# Patient Record
Sex: FEMALE | Race: BLACK OR AFRICAN AMERICAN | NOT HISPANIC OR LATINO | Employment: UNEMPLOYED | URBAN - METROPOLITAN AREA
[De-identification: names, ages, dates, MRNs, and addresses within clinical notes are randomized per-mention and may not be internally consistent; named-entity substitution may affect disease eponyms.]

---

## 2021-07-10 ENCOUNTER — HOSPITAL ENCOUNTER (INPATIENT)
Facility: HOSPITAL | Age: 41
LOS: 8 days | Discharge: NURSING, REHAB OR LONG-TERM CARE | DRG: 812 | End: 2021-07-20
Attending: EMERGENCY MEDICINE | Admitting: STUDENT IN AN ORGANIZED HEALTH CARE EDUCATION/TRAINING PROGRAM
Payer: COMMERCIAL

## 2021-07-10 ENCOUNTER — APPOINTMENT (EMERGENCY)
Dept: RADIOLOGY | Facility: HOSPITAL | Age: 41
DRG: 812 | End: 2021-07-10
Payer: COMMERCIAL

## 2021-07-10 DIAGNOSIS — D57.00 SICKLE CELL PAIN CRISIS (HCC): Primary | ICD-10-CM

## 2021-07-10 DIAGNOSIS — R07.9 CHEST PAIN: ICD-10-CM

## 2021-07-10 DIAGNOSIS — D57.00 SICKLE CELL DISEASE WITH CRISIS (HCC): ICD-10-CM

## 2021-07-10 DIAGNOSIS — D57.00 SICKLE CELL ANEMIA WITH PAIN (HCC): ICD-10-CM

## 2021-07-10 DIAGNOSIS — K59.00 CONSTIPATION: ICD-10-CM

## 2021-07-10 DIAGNOSIS — M54.9 ACUTE BACK PAIN: ICD-10-CM

## 2021-07-10 PROBLEM — D72.829 ELEVATED WBC COUNT: Status: ACTIVE | Noted: 2021-07-10

## 2021-07-10 PROBLEM — D57.1 SICKLE CELL ANEMIA (HCC): Status: ACTIVE | Noted: 2021-07-10

## 2021-07-10 PROBLEM — E11.9 DIET-CONTROLLED DIABETES MELLITUS (HCC): Status: ACTIVE | Noted: 2021-07-10

## 2021-07-10 LAB
ALBUMIN SERPL BCP-MCNC: 4 G/DL (ref 3.5–5)
ALP SERPL-CCNC: 48 U/L (ref 46–116)
ALT SERPL W P-5'-P-CCNC: 32 U/L (ref 12–78)
ANION GAP SERPL CALCULATED.3IONS-SCNC: 10 MMOL/L (ref 4–13)
ANISOCYTOSIS BLD QL SMEAR: PRESENT
AST SERPL W P-5'-P-CCNC: 30 U/L (ref 5–45)
BACTERIA UR QL AUTO: ABNORMAL /HPF
BASOPHILS # BLD MANUAL: 0 THOUSAND/UL (ref 0–0.1)
BASOPHILS NFR MAR MANUAL: 0 % (ref 0–1)
BILIRUB SERPL-MCNC: 0.76 MG/DL (ref 0.2–1)
BILIRUB UR QL STRIP: NEGATIVE
BUN SERPL-MCNC: 8 MG/DL (ref 5–25)
CALCIUM SERPL-MCNC: 8.6 MG/DL (ref 8.3–10.1)
CHLORIDE SERPL-SCNC: 103 MMOL/L (ref 100–108)
CLARITY UR: ABNORMAL
CO2 SERPL-SCNC: 28 MMOL/L (ref 21–32)
COLOR UR: YELLOW
CREAT SERPL-MCNC: 0.76 MG/DL (ref 0.6–1.3)
EOSINOPHIL # BLD MANUAL: 0 THOUSAND/UL (ref 0–0.4)
EOSINOPHIL NFR BLD MANUAL: 0 % (ref 0–6)
ERYTHROCYTE [DISTWIDTH] IN BLOOD BY AUTOMATED COUNT: 16.6 % (ref 11.6–15.1)
GFR SERPL CREATININE-BSD FRML MDRD: 111 ML/MIN/1.73SQ M
GLUCOSE SERPL-MCNC: 109 MG/DL (ref 65–140)
GLUCOSE SERPL-MCNC: 120 MG/DL (ref 65–140)
GLUCOSE UR STRIP-MCNC: NEGATIVE MG/DL
HCT VFR BLD AUTO: 33.7 % (ref 34.8–46.1)
HGB BLD-MCNC: 11.1 G/DL (ref 11.5–15.4)
HGB UR QL STRIP.AUTO: ABNORMAL
KETONES UR STRIP-MCNC: ABNORMAL MG/DL
LEUKOCYTE ESTERASE UR QL STRIP: NEGATIVE
LYMPHOCYTES # BLD AUTO: 1.21 THOUSAND/UL (ref 0.6–4.47)
LYMPHOCYTES # BLD AUTO: 9 % (ref 14–44)
MCH RBC QN AUTO: 22.2 PG (ref 26.8–34.3)
MCHC RBC AUTO-ENTMCNC: 32.9 G/DL (ref 31.4–37.4)
MCV RBC AUTO: 67 FL (ref 82–98)
MICROCYTES BLD QL AUTO: PRESENT
MONOCYTES # BLD AUTO: 1.08 THOUSAND/UL (ref 0–1.22)
MONOCYTES NFR BLD: 8 % (ref 4–12)
MYELOCYTES NFR BLD MANUAL: 3 % (ref 0–1)
NEUTROPHILS # BLD MANUAL: 10.78 THOUSAND/UL (ref 1.85–7.62)
NEUTS BAND NFR BLD MANUAL: 4 % (ref 0–8)
NEUTS SEG NFR BLD AUTO: 76 % (ref 43–75)
NITRITE UR QL STRIP: NEGATIVE
NON-SQ EPI CELLS URNS QL MICRO: ABNORMAL /HPF
NRBC BLD AUTO-RTO: 2 /100 WBC (ref 0–2)
PH UR STRIP.AUTO: 5.5 [PH] (ref 4.5–8)
PLATELET # BLD AUTO: 251 THOUSANDS/UL (ref 149–390)
PLATELET BLD QL SMEAR: ADEQUATE
PMV BLD AUTO: 9.8 FL (ref 8.9–12.7)
POTASSIUM SERPL-SCNC: 3.5 MMOL/L (ref 3.5–5.3)
PROT SERPL-MCNC: 8.1 G/DL (ref 6.4–8.2)
PROT UR STRIP-MCNC: NEGATIVE MG/DL
RBC # BLD AUTO: 5 MILLION/UL (ref 3.81–5.12)
RBC #/AREA URNS AUTO: ABNORMAL /HPF
RETICS # AUTO: ABNORMAL 10*3/UL (ref 14097–95744)
RETICS # CALC: 2.74 % (ref 0.37–1.87)
SODIUM SERPL-SCNC: 141 MMOL/L (ref 136–145)
SP GR UR STRIP.AUTO: 1.02 (ref 1–1.03)
TOTAL CELLS COUNTED SPEC: 100
TROPONIN I SERPL-MCNC: <0.02 NG/ML
UROBILINOGEN UR QL STRIP.AUTO: 1 E.U./DL
WBC # BLD AUTO: 13.48 THOUSAND/UL (ref 4.31–10.16)
WBC #/AREA URNS AUTO: ABNORMAL /HPF

## 2021-07-10 PROCEDURE — 71046 X-RAY EXAM CHEST 2 VIEWS: CPT

## 2021-07-10 PROCEDURE — 99285 EMERGENCY DEPT VISIT HI MDM: CPT | Performed by: EMERGENCY MEDICINE

## 2021-07-10 PROCEDURE — 84484 ASSAY OF TROPONIN QUANT: CPT | Performed by: EMERGENCY MEDICINE

## 2021-07-10 PROCEDURE — 85007 BL SMEAR W/DIFF WBC COUNT: CPT | Performed by: EMERGENCY MEDICINE

## 2021-07-10 PROCEDURE — 96374 THER/PROPH/DIAG INJ IV PUSH: CPT

## 2021-07-10 PROCEDURE — 99220 PR INITIAL OBSERVATION CARE/DAY 70 MINUTES: CPT | Performed by: FAMILY MEDICINE

## 2021-07-10 PROCEDURE — 82948 REAGENT STRIP/BLOOD GLUCOSE: CPT

## 2021-07-10 PROCEDURE — 99285 EMERGENCY DEPT VISIT HI MDM: CPT

## 2021-07-10 PROCEDURE — 96376 TX/PRO/DX INJ SAME DRUG ADON: CPT

## 2021-07-10 PROCEDURE — 96361 HYDRATE IV INFUSION ADD-ON: CPT

## 2021-07-10 PROCEDURE — 81001 URINALYSIS AUTO W/SCOPE: CPT

## 2021-07-10 PROCEDURE — 85027 COMPLETE CBC AUTOMATED: CPT | Performed by: EMERGENCY MEDICINE

## 2021-07-10 PROCEDURE — 93005 ELECTROCARDIOGRAM TRACING: CPT

## 2021-07-10 PROCEDURE — 85045 AUTOMATED RETICULOCYTE COUNT: CPT | Performed by: EMERGENCY MEDICINE

## 2021-07-10 PROCEDURE — 80053 COMPREHEN METABOLIC PANEL: CPT | Performed by: EMERGENCY MEDICINE

## 2021-07-10 PROCEDURE — 96375 TX/PRO/DX INJ NEW DRUG ADDON: CPT

## 2021-07-10 PROCEDURE — 85025 COMPLETE CBC W/AUTO DIFF WBC: CPT | Performed by: EMERGENCY MEDICINE

## 2021-07-10 PROCEDURE — 36415 COLL VENOUS BLD VENIPUNCTURE: CPT | Performed by: EMERGENCY MEDICINE

## 2021-07-10 RX ORDER — SODIUM CHLORIDE 9 MG/ML
75 INJECTION, SOLUTION INTRAVENOUS CONTINUOUS
Status: DISCONTINUED | OUTPATIENT
Start: 2021-07-10 | End: 2021-07-11

## 2021-07-10 RX ORDER — ACETAMINOPHEN 325 MG/1
650 TABLET ORAL EVERY 6 HOURS PRN
Status: DISCONTINUED | OUTPATIENT
Start: 2021-07-10 | End: 2021-07-20

## 2021-07-10 RX ORDER — MORPHINE SULFATE 4 MG/ML
4 INJECTION, SOLUTION INTRAMUSCULAR; INTRAVENOUS ONCE
Status: COMPLETED | OUTPATIENT
Start: 2021-07-10 | End: 2021-07-10

## 2021-07-10 RX ORDER — MORPHINE SULFATE 4 MG/ML
4 INJECTION, SOLUTION INTRAMUSCULAR; INTRAVENOUS
Status: DISCONTINUED | OUTPATIENT
Start: 2021-07-10 | End: 2021-07-18

## 2021-07-10 RX ORDER — ONDANSETRON 2 MG/ML
4 INJECTION INTRAMUSCULAR; INTRAVENOUS ONCE
Status: COMPLETED | OUTPATIENT
Start: 2021-07-10 | End: 2021-07-10

## 2021-07-10 RX ORDER — ONDANSETRON 2 MG/ML
4 INJECTION INTRAMUSCULAR; INTRAVENOUS EVERY 6 HOURS PRN
Status: DISCONTINUED | OUTPATIENT
Start: 2021-07-10 | End: 2021-07-20 | Stop reason: HOSPADM

## 2021-07-10 RX ADMIN — SODIUM CHLORIDE 1000 ML: 0.9 INJECTION, SOLUTION INTRAVENOUS at 19:18

## 2021-07-10 RX ADMIN — SODIUM CHLORIDE 75 ML/HR: 0.9 INJECTION, SOLUTION INTRAVENOUS at 22:05

## 2021-07-10 RX ADMIN — MORPHINE SULFATE 4 MG: 4 INJECTION INTRAVENOUS at 19:18

## 2021-07-10 RX ADMIN — ONDANSETRON 4 MG: 2 INJECTION INTRAMUSCULAR; INTRAVENOUS at 19:13

## 2021-07-10 RX ADMIN — MORPHINE SULFATE 4 MG: 4 INJECTION INTRAVENOUS at 22:17

## 2021-07-10 RX ADMIN — MORPHINE SULFATE 4 MG: 4 INJECTION INTRAVENOUS at 20:08

## 2021-07-11 LAB
ANION GAP SERPL CALCULATED.3IONS-SCNC: 7 MMOL/L (ref 4–13)
ATRIAL RATE: 101 BPM
BASOPHILS # BLD AUTO: 0.03 THOUSANDS/ΜL (ref 0–0.1)
BASOPHILS NFR BLD AUTO: 0 % (ref 0–1)
BUN SERPL-MCNC: 7 MG/DL (ref 5–25)
CALCIUM SERPL-MCNC: 8.3 MG/DL (ref 8.3–10.1)
CHLORIDE SERPL-SCNC: 105 MMOL/L (ref 100–108)
CO2 SERPL-SCNC: 27 MMOL/L (ref 21–32)
CREAT SERPL-MCNC: 0.69 MG/DL (ref 0.6–1.3)
EOSINOPHIL # BLD AUTO: 0.02 THOUSAND/ΜL (ref 0–0.61)
EOSINOPHIL NFR BLD AUTO: 0 % (ref 0–6)
ERYTHROCYTE [DISTWIDTH] IN BLOOD BY AUTOMATED COUNT: 16.3 % (ref 11.6–15.1)
EST. AVERAGE GLUCOSE BLD GHB EST-MCNC: 88 MG/DL
GFR SERPL CREATININE-BSD FRML MDRD: 123 ML/MIN/1.73SQ M
GLUCOSE P FAST SERPL-MCNC: 104 MG/DL (ref 65–99)
GLUCOSE SERPL-MCNC: 102 MG/DL (ref 65–140)
GLUCOSE SERPL-MCNC: 104 MG/DL (ref 65–140)
GLUCOSE SERPL-MCNC: 110 MG/DL (ref 65–140)
GLUCOSE SERPL-MCNC: 152 MG/DL (ref 65–140)
GLUCOSE SERPL-MCNC: 95 MG/DL (ref 65–140)
HBA1C MFR BLD: 4.7 %
HCT VFR BLD AUTO: 29.7 % (ref 34.8–46.1)
HGB BLD-MCNC: 9.8 G/DL (ref 11.5–15.4)
IMM GRANULOCYTES # BLD AUTO: 0.16 THOUSAND/UL (ref 0–0.2)
IMM GRANULOCYTES NFR BLD AUTO: 2 % (ref 0–2)
LDH SERPL-CCNC: 387 U/L (ref 81–234)
LYMPHOCYTES # BLD AUTO: 1.29 THOUSANDS/ΜL (ref 0.6–4.47)
LYMPHOCYTES NFR BLD AUTO: 18 % (ref 14–44)
MCH RBC QN AUTO: 22.6 PG (ref 26.8–34.3)
MCHC RBC AUTO-ENTMCNC: 33 G/DL (ref 31.4–37.4)
MCV RBC AUTO: 68 FL (ref 82–98)
MONOCYTES # BLD AUTO: 0.74 THOUSAND/ΜL (ref 0.17–1.22)
MONOCYTES NFR BLD AUTO: 10 % (ref 4–12)
NEUTROPHILS # BLD AUTO: 4.92 THOUSANDS/ΜL (ref 1.85–7.62)
NEUTS SEG NFR BLD AUTO: 70 % (ref 43–75)
NRBC BLD AUTO-RTO: 0 /100 WBCS
P AXIS: 63 DEGREES
PLATELET # BLD AUTO: 172 THOUSANDS/UL (ref 149–390)
PMV BLD AUTO: 9.6 FL (ref 8.9–12.7)
POTASSIUM SERPL-SCNC: 3.4 MMOL/L (ref 3.5–5.3)
PR INTERVAL: 138 MS
QRS AXIS: 55 DEGREES
QRSD INTERVAL: 64 MS
QT INTERVAL: 312 MS
QTC INTERVAL: 404 MS
RBC # BLD AUTO: 4.34 MILLION/UL (ref 3.81–5.12)
SODIUM SERPL-SCNC: 139 MMOL/L (ref 136–145)
T WAVE AXIS: 59 DEGREES
VENTRICULAR RATE: 101 BPM
WBC # BLD AUTO: 7.16 THOUSAND/UL (ref 4.31–10.16)

## 2021-07-11 PROCEDURE — 99225 PR SBSQ OBSERVATION CARE/DAY 25 MINUTES: CPT | Performed by: STUDENT IN AN ORGANIZED HEALTH CARE EDUCATION/TRAINING PROGRAM

## 2021-07-11 PROCEDURE — 93010 ELECTROCARDIOGRAM REPORT: CPT | Performed by: INTERNAL MEDICINE

## 2021-07-11 PROCEDURE — 83036 HEMOGLOBIN GLYCOSYLATED A1C: CPT | Performed by: FAMILY MEDICINE

## 2021-07-11 PROCEDURE — 83615 LACTATE (LD) (LDH) ENZYME: CPT | Performed by: STUDENT IN AN ORGANIZED HEALTH CARE EDUCATION/TRAINING PROGRAM

## 2021-07-11 PROCEDURE — 85025 COMPLETE CBC W/AUTO DIFF WBC: CPT | Performed by: FAMILY MEDICINE

## 2021-07-11 PROCEDURE — 82948 REAGENT STRIP/BLOOD GLUCOSE: CPT

## 2021-07-11 PROCEDURE — 80048 BASIC METABOLIC PNL TOTAL CA: CPT | Performed by: FAMILY MEDICINE

## 2021-07-11 RX ORDER — SODIUM CHLORIDE 450 MG/100ML
75 INJECTION, SOLUTION INTRAVENOUS CONTINUOUS
Status: DISCONTINUED | OUTPATIENT
Start: 2021-07-11 | End: 2021-07-16

## 2021-07-11 RX ADMIN — SODIUM CHLORIDE 75 ML/HR: 0.45 INJECTION, SOLUTION INTRAVENOUS at 17:33

## 2021-07-11 RX ADMIN — MORPHINE SULFATE 4 MG: 4 INJECTION INTRAVENOUS at 13:08

## 2021-07-11 RX ADMIN — MORPHINE SULFATE 4 MG: 4 INJECTION INTRAVENOUS at 20:59

## 2021-07-11 RX ADMIN — ACETAMINOPHEN 650 MG: 325 TABLET, FILM COATED ORAL at 22:03

## 2021-07-11 RX ADMIN — INSULIN LISPRO 1 UNITS: 100 INJECTION, SOLUTION INTRAVENOUS; SUBCUTANEOUS at 12:02

## 2021-07-11 RX ADMIN — MORPHINE SULFATE 4 MG: 4 INJECTION INTRAVENOUS at 06:33

## 2021-07-11 RX ADMIN — MORPHINE SULFATE 4 MG: 4 INJECTION INTRAVENOUS at 03:33

## 2021-07-11 RX ADMIN — MORPHINE SULFATE 4 MG: 4 INJECTION INTRAVENOUS at 17:32

## 2021-07-11 RX ADMIN — SODIUM CHLORIDE 75 ML/HR: 0.9 INJECTION, SOLUTION INTRAVENOUS at 12:01

## 2021-07-11 RX ADMIN — MORPHINE SULFATE 4 MG: 4 INJECTION INTRAVENOUS at 09:47

## 2021-07-11 NOTE — ASSESSMENT & PLAN NOTE
Lab Results   Component Value Date    HGBA1C 4 7 07/11/2021       Recent Labs     07/10/21  2208 07/11/21 0723 07/11/21  1118   POCGLU 109 110 152*       Blood Sugar Average: Last 72 hrs:  (P) 968 [id number]    Not on medications  Diabetic diet  Sliding scale

## 2021-07-11 NOTE — ED PROVIDER NOTES
History  Chief Complaint   Patient presents with    Back Pain     was lifting today, moving, reports back pain and bilat leg pain  also states chest pain and hx sickle cell  39-year-old female past medical history significant for sickle cell disease presents for evaluation of sharp pain that starts in her lower back, extends up the her back into her chest   Pain started gradually 6 hours ago after lifting and moving things earlier today  Pain is rated as severe, worse with movement she states that feels similar to prior sickle cell attacks  No lower extremity edema or calf pain, risk factors for DVT or PE  History provided by:  Patient  Back Pain  Associated symptoms: chest pain    Associated symptoms: no abdominal pain, no dysuria, no fever, no numbness and no weakness        None       Past Medical History:   Diagnosis Date    Sickle cell disease ([facility] )        Past Surgical History:   Procedure Laterality Date    NO PAST SURGERIES         History reviewed  No pertinent family history  I have reviewed and agree with the history as documented  E-Cigarette/Vaping     E-Cigarette/Vaping Substances     Social History     Tobacco Use    Smoking status: Never Smoker    Smokeless tobacco: Never Used   Substance Use Topics    Alcohol use: Never    Drug use: Yes     Types: Marijuana       Review of Systems   Constitutional: Negative for activity change, appetite change, fatigue and fever  HENT: Negative for congestion, dental problem, ear pain, rhinorrhea and sore throat  Eyes: Negative for pain and redness  Respiratory: Negative for chest tightness, shortness of breath and wheezing  Cardiovascular: Positive for chest pain  Negative for palpitations  Gastrointestinal: Negative for abdominal pain, blood in stool, constipation, diarrhea, nausea and vomiting  Endocrine: Negative for cold intolerance and heat intolerance  Genitourinary: Negative for dysuria, frequency and hematuria  Musculoskeletal: Positive for back pain  Negative for arthralgias and myalgias  Skin: Negative for color change, pallor and rash  Neurological: Negative for weakness and numbness  Hematological: Does not bruise/bleed easily  Psychiatric/Behavioral: Negative for agitation, hallucinations and suicidal ideas  Physical Exam  Physical Exam  Constitutional:       Appearance: She is well-developed  HENT:      Head: Normocephalic and atraumatic  Eyes:      Pupils: Pupils are equal, round, and reactive to light  Neck:      Vascular: No JVD  Trachea: No tracheal deviation  Cardiovascular:      Rate and Rhythm: Normal rate and regular rhythm  Pulmonary:      Effort: Pulmonary effort is normal  No tachypnea, accessory muscle usage or respiratory distress  Breath sounds: Normal breath sounds  Abdominal:      General: There is no distension  Palpations: There is no mass  Tenderness: There is no abdominal tenderness  Musculoskeletal:      Cervical back: Normal range of motion and neck supple  Right lower leg: Normal       Left lower leg: Normal    Skin:     General: Skin is warm  Capillary Refill: Capillary refill takes less than 2 seconds  Neurological:      Mental Status: She is alert and oriented to person, place, and time     Psychiatric:         Behavior: Behavior normal          Vital Signs  ED Triage Vitals   Temperature Pulse Respirations Blood Pressure SpO2   07/10/21 1842 07/10/21 1840 07/10/21 1840 07/10/21 1840 07/10/21 1840   98 °F (36 7 °C) 105 18 144/78 100 %      Temp Source Heart Rate Source Patient Position - Orthostatic VS BP Location FiO2 (%)   07/10/21 1842 -- 07/10/21 1840 07/10/21 1840 --   Oral  Sitting Right arm       Pain Score       07/10/21 1840       Worst Possible Pain           Vitals:    07/10/21 1840 07/10/21 2045 07/10/21 2137   BP: 144/78 148/88 143/84   Pulse: 105 96 98   Patient Position - Orthostatic VS: Sitting           Visual Acuity      ED Medications  Medications   sodium chloride 0 9 % infusion (75 mL/hr Intravenous New Bag 7/10/21 2205)   acetaminophen (TYLENOL) tablet 650 mg (has no administration in time range)   ondansetron (ZOFRAN) injection 4 mg (has no administration in time range)   morphine injection 2 mg ( Intravenous See Alternative 7/10/21 2217)     Or   morphine (PF) 4 mg/mL injection 4 mg (4 mg Intravenous Given 7/10/21 2217)   insulin lispro (HumaLOG) 100 units/mL subcutaneous injection 1-5 Units (has no administration in time range)   insulin lispro (HumaLOG) 100 units/mL subcutaneous injection 1-5 Units (1 Units Subcutaneous Not Given 7/10/21 2209)   sodium chloride 0 9 % bolus 1,000 mL (0 mL Intravenous Stopped 7/10/21 2048)   morphine (PF) 4 mg/mL injection 4 mg (4 mg Intravenous Given 7/10/21 1918)   ondansetron (ZOFRAN) injection 4 mg (4 mg Intravenous Given 7/10/21 1913)   morphine (PF) 4 mg/mL injection 4 mg (4 mg Intravenous Given 7/10/21 2008)       Diagnostic Studies  Results Reviewed     Procedure Component Value Units Date/Time    Urine Microscopic [[id number]]  (Abnormal) Collected: 07/10/21 2045    Lab Status: Final result Specimen: Urine, Clean Catch Updated: 07/10/21 2100     RBC, UA 0-1 /hpf      WBC, UA 0-1 /hpf      Epithelial Cells Occasional /hpf      Bacteria, UA Innumerable /hpf     Urine Macroscopic, POC [[id number]]  (Abnormal) Collected: 07/10/21 2045    Lab Status: Final result Specimen: Urine Updated: 07/10/21 2046     Color, UA Yellow     Clarity, UA Cloudy     pH, UA 5 5     Leukocytes, UA Negative     Nitrite, UA Negative     Protein, UA Negative mg/dl      Glucose, UA Negative mg/dl      Ketones, UA 40 (2+) mg/dl      Urobilinogen, UA 1 0 E U /dl      Bilirubin, UA Negative     Blood, UA Trace     Specific Marshall, UA 1 020    Narrative:      CLINITEK RESULT    Manual Differential(PHLEBS Do Not Order) [[id number]]  (Abnormal) Collected: 07/10/21 1913    Lab Status: Final result Specimen: Blood from Arm, Left Updated: 07/10/21 2017     Segmented % 76 %      Bands % 4 %      Lymphocytes % 9 %      Monocytes % 8 %      Eosinophils, % 0 %      Basophils % 0 %      Myelocytes % 3 %      Absolute Neutrophils 10 78 Thousand/uL      Lymphocytes Absolute 1 21 Thousand/uL      Monocytes Absolute 1 08 Thousand/uL      Eosinophils Absolute 0 00 Thousand/uL      Basophils Absolute 0 00 Thousand/uL      Total Counted 100     nRBC 2 /100 WBC      Anisocytosis Present     Microcytes Present     Platelet Estimate Adequate    Troponin I [[id number]]  (Normal) Collected: 07/10/21 1913    Lab Status: Final result Specimen: Blood from Arm, Left Updated: 07/10/21 1941     Troponin I <0 02 ng/mL     Comprehensive metabolic panel [[id number]] Collected: 07/10/21 1913    Lab Status: Final result Specimen: Blood from Arm, Left Updated: 07/10/21 1940     Sodium 141 mmol/L      Potassium 3 5 mmol/L      Chloride 103 mmol/L      CO2 28 mmol/L      ANION GAP 10 mmol/L      BUN 8 mg/dL      Creatinine 0 76 mg/dL      Glucose 120 mg/dL      Calcium 8 6 mg/dL      AST 30 U/L      ALT 32 U/L      Alkaline Phosphatase 48 U/L      Total Protein 8 1 g/dL      Albumin 4 0 g/dL      Total Bilirubin 0 76 mg/dL      eGFR 111 ml/min/1 73sq m     Narrative:      [place] guidelines for Chronic Kidney Disease (CKD):     Stage 1 with normal or high GFR (GFR > 90 mL/min/1 73 square meters)    Stage 2 Mild CKD (GFR = 60-89 mL/min/1 73 square meters)    Stage 3A Moderate CKD (GFR = 45-59 mL/min/1 73 square meters)    Stage 3B Moderate CKD (GFR = 30-44 mL/min/1 73 square meters)    Stage 4 Severe CKD (GFR = 15-29 mL/min/1 73 square meters)    Stage 5 End Stage CKD (GFR <15 mL/min/1 73 square meters)  Note: GFR calculation is accurate only with a steady state creatinine    CBC and differential [[id number]]  (Abnormal) Collected: 07/10/21 1913    Lab Status: Final result Specimen: Blood from Arm, Left Updated: 07/10/21 1930     WBC 13 48 Thousand/uL      RBC 5 00 Million/uL      Hemoglobin 11 1 g/dL      Hematocrit 33 7 %      MCV 67 fL      MCH 22 2 pg      MCHC 32 9 g/dL      RDW 16 6 %      MPV 9 8 fL      Platelets 083 Thousands/uL     Narrative: This is an appended report  These results have been appended to a previously verified report  Reticulocytes [[id number]]  (Abnormal) Collected: 07/10/21 1913    Lab Status: Final result Specimen: Blood from Arm, Left Updated: 07/10/21 1925     Retic Ct Abs 137,000     Retic Ct Pct 2 74 %                  XR chest 2 views   ED Interpretation by Bri Whitfield MD (07/10 2003)   Primary reviweed No acute abnormality                 Procedures  ECG 12 Lead Documentation Only    Date/Time: 7/10/2021 10:49 PM  Performed by: Bri Whitfield MD  Authorized by: Bri Whitfield MD     ECG reviewed by me, the ED Provider: yes    Patient location:  ED  Rate:     ECG rate:  101    ECG rate assessment: tachycardic    Rhythm:     Rhythm: sinus tachycardia    Ectopy:     Ectopy: none    QRS:     QRS axis:  Normal    QRS intervals:  Normal  Conduction:     Conduction: normal    ST segments:     ST segments:  Normal  T waves:     T waves: non-specific               ED Course  ED Course as of Jul 10 2301   Sat Jul 10, 2021   2052 Will admit for sickle cell pain crisis                HEART Risk Score      Most Recent Value   Heart Score Risk Calculator   History  0 Filed at: 07/10/2021 2112   ECG  1 Filed at: 07/10/2021 2112   Age  0 Filed at: 07/10/2021 2112   Risk Factors  1 Filed at: 07/10/2021 2112   Troponin  0 Filed at: 07/10/2021 2112   HEART Score  2 Filed at: 07/10/2021 2112                      SBIRT 22yo+      Most Recent Value   SBIRT (23 yo +)   In order to provide better care to our patients, we are screening all of our patients for alcohol and drug use  Would it be okay to ask you these screening questions?   No Filed at: 07/10/2021 1923                    [facility]  Number of Diagnoses or Management Options  Acute back pain  Chest pain  Sickle cell pain crisis ([place] [address] )  Diagnosis management comments: Back pain, chest pain, history of sickle cell-will do cardiac workup, retic count, IV fluids, treat symptoms, reassess for disposition      Disposition  Final diagnoses:   Sickle cell pain crisis ([facility] )   Acute back pain   Chest pain     Time reflects when diagnosis was documented in both MDM as applicable and the Disposition within this note     Time User Action Codes Description Comment    7/10/2021  9:13 PM Maxi HUBBARD Add [D57 00] Sickle cell pain crisis ([facility] )     7/10/2021  9:13 PM Angel Bliss [M54 [id number]] Acute back pain     7/10/2021  9:13 PM Angel Bliss [R07 9] Chest pain       ED Disposition     ED Disposition Condition Date/Time Comment    Admit Stable Sat Jul 10, 2021  9:11 PM Case was discussed with Dr Manan Kennedy and the patient's admission status was agreed to be Admission Status: observation status to the service of Dr Manan Kennedy   Follow-up Information    None         There are no discharge medications for this patient  No discharge procedures on file      PDMP Review     None          ED Provider  Electronically Signed by           Ivania Chester MD  07/10/21 7575

## 2021-07-11 NOTE — ASSESSMENT & PLAN NOTE
Possible degree of volume depletion, otherwise no infectious signs, chest x-ray unremarkable  Proceed with gentle IV fluids  Check CBC in the morning  Monitor vital signs

## 2021-07-11 NOTE — H&P
[facility]  H&P- Bri Rollins 1980, 35 y o  female MRN: [id number]  Unit/Bed#: Laura Che -01 Encounter: [id number]  Primary Care Provider: No primary care provider on file  Date and time admitted to hospital: 7/10/2021  6:43 PM    * Sickle cell anemia with pain [facility])  Assessment & Plan  Patient presents with chest pain after carrying belongings as she is moving from [place] to this area  - she has history of sickle cell and followed in [place], she states that lasted she required blood transfusions years ago  - her hemoglobin is near normal at 11 1  - patient has mild leukocytosis but otherwise infectious workup unremarkable, chest x-ray appears negative for acute findings per my interpretation, official report pending  - patient may be slightly dehydrated with physical activity during hot summer day  - proceed with gentle IV fluids  - IV morphine for pain management  - case management consult to establish care and is geographic area      Elevated WBC count  Assessment & Plan  Possible degree of volume depletion, otherwise no infectious signs, chest x-ray unremarkable  Proceed with gentle IV fluids  Check CBC in the morning  Monitor vital signs    Diet-controlled diabetes mellitus ([facility] [address] )  Assessment & Plan  No results found for: HGBA1C    No results for input(s): POCGLU in the last 72 hours  Blood Sugar Average: Last 72 hrs:    Not on medication  Diabetic diet  Accu-checks/ISS  Hemoglobin A1C        VTE Prophylaxis: None   / sequential compression device   Code Status: Full  POLST: POLST form is not discussed and not completed at this time  Discussion with family: Patient    Anticipated Length of Stay:  Patient will be admitted on an Observation basis with an anticipated length of stay of less than 2 midnights  Justification for Hospital Stay: pain management    Total Time for Visit, including Counseling / Coordination of Care: 60 minutes    Greater than 50% of this total time spent on direct patient counseling and coordination of care  Chief Complaint:   Generalized pain    History of Present Illness:    Lauryn Magana is a 35 y o  female with history of sickle cell anemia and diabetes who presents with generalized pain  The patient is in the process of moving from [place] to [facility], and when moving her belongings, she developed chest pain later moving downward her trunk and also now in lower extremities  The patient states that she was worried that she is in sickle cell pain crisis  She denies chills or fevers, she denies cough  She reports normal appetite  Denies GI or urinary symptoms  The patient states that she has been following with her medical care in [place]  She states that she has had a blood transfusion many years ago previously  She states that her diabetes is diet controlled and she is not on chronic medications  Patient denies tobacco abuse, reports occasional alcohol use  Review of Systems:    Review of Systems    Past Medical and Surgical History:     Past Medical History:   Diagnosis Date    Sickle cell disease ([facility] [address] )        Past Surgical History:   Procedure Laterality Date    NO PAST SURGERIES         Meds/Allergies:    Prior to Admission medications    Not on File     I have reviewed home medications with a medical source (PCP, Pharmacy, other)      Allergies: No Known Allergies    Social History:     Marital Status: Single   Occupation: none currently  Patient Pre-hospital Living Situation: significant other  Patient Pre-hospital Level of Mobility: independent  Patient Pre-hospital Diet Restrictions: none   Substance Use History:   Social History     Substance and Sexual Activity   Alcohol Use Never     Social History     Tobacco Use   Smoking Status Never Smoker   Smokeless Tobacco Never Used     Social History     Substance and Sexual Activity   Drug Use Yes    Types: Marijuana       Family History:    History reviewed  No pertinent family history  Physical Exam:     Vitals:   Blood Pressure: 143/84 (07/10/21 2137)  Pulse: 98 (07/10/21 2137)  Temperature: 97 9 °F (36 6 °C) (07/10/21 2137)  Temp Source: Oral (07/10/21 1842)  Respirations: 20 (07/10/21 2137)  SpO2: 98 % (07/10/21 2137)    Physical Exam  Constitutional:       General: She is not in acute distress  Appearance: She is ill-appearing  HENT:      Head: Normocephalic and atraumatic  Right Ear: External ear normal       Nose: No congestion  Mouth/Throat:      Pharynx: Oropharynx is clear  Eyes:      Conjunctiva/sclera: Conjunctivae normal    Cardiovascular:      Rate and Rhythm: Normal rate and regular rhythm  Heart sounds: No murmur heard  Pulmonary:      Effort: No respiratory distress  Breath sounds: No wheezing or rales  Abdominal:      General: There is no distension  Tenderness: There is no abdominal tenderness  There is no guarding  Musculoskeletal:      Right lower leg: No edema  Left lower leg: No edema  Skin:     General: Skin is warm and dry  Neurological:      Mental Status: She is oriented to person, place, and time  Psychiatric:         Mood and Affect: Mood normal           Additional Data:     Lab Results: I have personally reviewed pertinent reports  Results from last 7 days   Lab Units 07/10/21  1913   WBC Thousand/uL 13 48*   HEMOGLOBIN g/dL 11 1*   HEMATOCRIT % 33 7*   PLATELETS Thousands/uL 251   BANDS PCT % 4   LYMPHO PCT % 9*   MONO PCT % 8   EOS PCT % 0     Results from last 7 days   Lab Units 07/10/21  1913   SODIUM mmol/L 141   POTASSIUM mmol/L 3 5   CHLORIDE mmol/L 103   CO2 mmol/L 28   BUN mg/dL 8   CREATININE mg/dL 0 76   ANION GAP mmol/L 10   CALCIUM mg/dL 8 6   ALBUMIN g/dL 4 0   TOTAL BILIRUBIN mg/dL 0 76   ALK PHOS U/L 48   ALT U/L 32   AST U/L 30   GLUCOSE RANDOM mg/dL 120                       Imaging: I have personally reviewed pertinent reports        XR chest 2 views ED Interpretation by Farhat Pappas MD (07/10 2003)   Primary reviweed No acute abnormality          EKG, Pathology, and Other Studies Reviewed on Admission:   · EKG: none    Allscripts / Epic Records Reviewed: Yes     ** Please Note: This note has been constructed using a voice recognition system   **

## 2021-07-11 NOTE — ASSESSMENT & PLAN NOTE
Patient presents with chest pain after carrying belongings as she is moving from [address] [place] [address] to this area  - she has history of sickle cell and followed in [address] [place] [address], she states that lasted she required blood transfusions years ago  - her hemoglobin is near normal at 11 1  - patient has mild leukocytosis but otherwise infectious workup unremarkable, chest x-ray appears negative for acute findings per my interpretation, official report pending  - patient may be slightly dehydrated with physical activity during hot summer day  - proceed with gentle IV fluids  - IV morphine for pain management  - case management consult to establish care and is geographic area

## 2021-07-11 NOTE — PROGRESS NOTES
[facility]  Progress Note - Vicenta Rome 1980, 35 y o  female MRN: [id number]  Unit/Bed#: [address]-01 Encounter: [id number]  Primary Care Provider: No primary care provider on file  Date and time admitted to hospital: 7/10/2021  6:43 PM    * Sickle cell anemia with pain [facility])  Assessment & Plan  35year old female presenting with sickle cell crisis  Not hypoxic   - Trend labs  - Pain control  - IV hydration     Elevated WBC count  Assessment & Plan  Possibly due to acute crisis  Recent Labs     07/10/21  1913 21  0755   WBC 13 48* 7 16         Diet-controlled diabetes mellitus [facility])  Assessment & Plan  Lab Results   Component Value Date    HGBA1C 4 7 2021       Recent Labs     07/10/21  2208 21  0723 21  1118   POCGLU 109 110 152*       Blood Sugar Average: Last 72 hrs:  (P) 063 [id number]    Not on medications  Diabetic diet  Sliding scale  VTE Pharmacologic Prophylaxis:   Pharmacologic: Pharmacologic VTE Prophylaxis contraindicated due to low risk  Mechanical VTE Prophylaxis in Place: Yes    Patient Centered Rounds: I have performed bedside rounds with nursing staff today  Discussions with Specialists or Other Care Team Provider: nursing    Education and Discussions with Family / Patient: patient    Time Spent for Care: 30 minutes  More than 50% of total time spent on counseling and coordination of care as described above  Current Length of Stay: 0 day(s)    Current Patient Status: Observation   Certification Statement: The patient will continue to require additional inpatient hospital stay due to pain control, iv hydration    Discharge Plan: active    Code Status: Level 1 - Full Code      Subjective:   Patient seen and examined at bedside  Continues to complain of hip and knee pain  Denies shortness of breath  She is grateful that we have her pain under control      Objective:     Vitals:   Temp (24hrs), Av 9 °F (37 2 °C), Min:97 9 °F (36 6 °C), Max:100 4 °F (38 °C)    Temp:  [97 9 °F (36 6 °C)-100 4 °F (38 °C)] 99 1 °F (37 3 °C)  HR:  [] 96  Resp:  [18-20] 18  BP: (109-148)/(76-88) 109/76  SpO2:  [94 %-100 %] 98 %  There is no height or weight on file to calculate BMI  Input and Output Summary (last 24 hours): Intake/Output Summary (Last 24 hours) at 7/11/2021 1542  Last data filed at 7/10/2021 2048  Gross per 24 hour   Intake 1000 ml   Output --   Net 1000 ml       Physical Exam:     Physical Exam  Vitals reviewed  Constitutional:       General: She is not in acute distress  Appearance: She is not ill-appearing  HENT:      Head: Normocephalic  Nose: Nose normal       Mouth/Throat:      Mouth: Mucous membranes are moist    Eyes:      General: No scleral icterus  Cardiovascular:      Rate and Rhythm: Normal rate and regular rhythm  Pulmonary:      Effort: Pulmonary effort is normal  No respiratory distress  Breath sounds: No wheezing or rales  Abdominal:      General: There is no distension  Palpations: Abdomen is soft  Tenderness: There is no abdominal tenderness  Musculoskeletal:         General: Tenderness (bilateral knees and shin) present  Skin:     General: Skin is warm  Neurological:      Mental Status: She is alert  Mental status is at baseline     Psychiatric:         Mood and Affect: Mood normal          Behavior: Behavior normal        Additional Data:     Labs:    Results from last 7 days   Lab Units 07/11/21  0755 07/10/21  1913   WBC Thousand/uL 7 16 13 48*   HEMOGLOBIN g/dL 9 8* 11 1*   HEMATOCRIT % 29 7* 33 7*   PLATELETS Thousands/uL 172 251   BANDS PCT %  --  4   NEUTROS PCT % 70  --    LYMPHS PCT % 18  --    LYMPHO PCT %  --  9*   MONOS PCT % 10  --    MONO PCT %  --  8   EOS PCT % 0 0     Results from last 7 days   Lab Units 07/11/21  0755 07/10/21  1913   SODIUM mmol/L 139 141   POTASSIUM mmol/L 3 4* 3 5   CHLORIDE mmol/L 105 103   CO2 mmol/L 27 28   BUN mg/dL 7 8   CREATININE mg/dL 0 69 0 76   ANION GAP mmol/L 7 10   CALCIUM mg/dL 8 3 8 6   ALBUMIN g/dL  --  4 0   TOTAL BILIRUBIN mg/dL  --  0 76   ALK PHOS U/L  --  48   ALT U/L  --  32   AST U/L  --  30   GLUCOSE RANDOM mg/dL 104 120         Results from last 7 days   Lab Units 07/11/21  1118 07/11/21  0723 07/10/21  2208   POC GLUCOSE mg/dl 152* 110 109     Results from last 7 days   Lab Units 07/11/21  0755   HEMOGLOBIN A1C % 4 7               * I Have Reviewed All Lab Data Listed Above  * Additional Pertinent Lab Tests Reviewed: [facility] [address] Admission Reviewed    Imaging:    Imaging Reports Reviewed Today Include: xr chest    Recent Cultures (last 7 days):           Last 24 Hours Medication List:   Current Facility-Administered Medications   Medication Dose Route Frequency Provider Last Rate    acetaminophen  650 mg Oral Q6H PRN Clara Horton MD      insulin lispro  1-5 Units Subcutaneous TID AC Clara Horton MD      insulin lispro  1-5 Units Subcutaneous HS Clara Horton MD      morphine injection  2 mg Intravenous Q3H PRN Clara Horton MD      Or    morphine injection  4 mg Intravenous Q3H PRN Clara Horton MD      ondansetron  4 mg Intravenous Q6H PRN Clara Horton MD      sodium chloride  75 mL/hr Intravenous Continuous Clara Horton MD 75 mL/hr (07/11/21 1201)        Today, Patient Was Seen By: Bianka Dawn MD    ** Please Note: Dictation voice to text software may have been used in the creation of this document   **

## 2021-07-11 NOTE — ASSESSMENT & PLAN NOTE
35year old female presenting with sickle cell crisis   Not hypoxic   - Trend labs  - Pain control  - IV hydration

## 2021-07-11 NOTE — PLAN OF CARE
Problem: Potential for Falls  Goal: Patient will remain free of falls  Description: INTERVENTIONS:  - Educate patient/family on patient safety including physical limitations  - Instruct patient to call for assistance with activity   - Consult OT/PT to assist with strengthening/mobility   - Keep Call bell within reach  - Keep bed low and locked with side rails adjusted as appropriate  - Keep care items and personal belongings within reach  - Initiate and maintain comfort rounds  - Make Fall Risk Sign visible to staff  - Offer Toileting every  Hours, in advance of need  - Initiate/Maintain alarm  - Obtain necessary fall risk management equipment:   - Apply yellow socks and bracelet for high fall risk patients  - Consider moving patient to room near nurses station  Outcome: Progressing     Problem: PAIN - ADULT  Goal: Verbalizes/displays adequate comfort level or baseline comfort level  Description: Interventions:  - Encourage patient to monitor pain and request assistance  - Assess pain using appropriate pain scale  - Administer analgesics based on type and severity of pain and evaluate response  - Implement non-pharmacological measures as appropriate and evaluate response  - Consider cultural and social influences on pain and pain management  - Notify physician/advanced practitioner if interventions unsuccessful or patient reports new pain  Outcome: Progressing     Problem: INFECTION - ADULT  Goal: Absence or prevention of progression during hospitalization  Description: INTERVENTIONS:  - Assess and monitor for signs and symptoms of infection  - Monitor lab/diagnostic results  - Monitor all insertion sites, i e  indwelling lines, tubes, and drains  - Monitor endotracheal if appropriate and nasal secretions for changes in amount and color  - [place] appropriate cooling/warming therapies per order  - Administer medications as ordered  - Instruct and encourage patient and family to use good hand hygiene technique  - Identify and instruct in appropriate isolation precautions for identified infection/condition  Outcome: Progressing  Goal: Absence of fever/infection during neutropenic period  Description: INTERVENTIONS:  - Monitor WBC    Outcome: Progressing     Problem: SAFETY ADULT  Goal: Maintain or return to baseline ADL function  Description: INTERVENTIONS:  -  Assess patient's ability to carry out ADLs; assess patient's baseline for ADL function and identify physical deficits which impact ability to perform ADLs (bathing, care of mouth/teeth, toileting, grooming, dressing, etc )  - Assess/evaluate cause of self-care deficits   - Assess range of motion  - Assess patient's mobility; develop plan if impaired  - Assess patient's need for assistive devices and provide as appropriate  - Encourage maximum independence but intervene and supervise when necessary  - Involve family in performance of ADLs  - Assess for home care needs following discharge   - Consider OT consult to assist with ADL evaluation and planning for discharge  - Provide patient education as appropriate  Outcome: Progressing  Goal: Maintains/Returns to pre admission functional level  Description: INTERVENTIONS:  - Perform BMAT or MOVE assessment daily    - Set and communicate daily mobility goal to care team and patient/family/caregiver  - Collaborate with rehabilitation services on mobility goals if consulted  - Perform Range of Motion  times a day  - Reposition patient every  hours    - Dangle patient  times a day  - Stand patient  times a day  - Ambulate patient  times a day  - Out of bed to chair  times a day   - Out of bed for meal times a day  - Out of bed for toileting  - Record patient progress and toleration of activity level   Outcome: Progressing     Problem: DISCHARGE PLANNING  Goal: Discharge to home or other facility with appropriate resources  Description: INTERVENTIONS:  - Identify barriers to discharge w/patient and caregiver  - Arrange for needed discharge resources and transportation as appropriate  - Identify discharge learning needs (meds, wound care, etc )  - Arrange for interpretive services to assist at discharge as needed  - Refer to Case Management Department for coordinating discharge planning if the patient needs post-hospital services based on physician/advanced practitioner order or complex needs related to functional status, cognitive ability, or social support system  Outcome: Progressing     Problem: Knowledge Deficit  Goal: Patient/family/caregiver demonstrates understanding of disease process, treatment plan, medications, and discharge instructions  Description: Complete learning assessment and assess knowledge base    Interventions:  - Provide teaching at level of understanding  - Provide teaching via preferred learning methods  Outcome: Progressing

## 2021-07-11 NOTE — UTILIZATION REVIEW
Initial Clinical Review    Admission: Date/Time/Statement:   Admission Orders (From admission, onward)     Ordered        07/10/21 2116  Place in Observation  Once                   Orders Placed This Encounter   Procedures    Place in Observation     Standing Status:   Standing     Number of Occurrences:   1     Order Specific Question:   Level of Care     Answer:   Med Surg [16]     ED Arrival Information     Expected Arrival Acuity    - 7/10/2021 18:26 Urgent         Means of arrival Escorted by Service Admission type    Wheelchair Friend General Medicine Urgent         Arrival complaint    back, legs and chest pain, sickle cell disease        Chief Complaint   Patient presents with    Back Pain     was lifting today, moving, reports back pain and bilat leg pain  also states chest pain and hx sickle cell  Initial Presentation: 36 yo female presented to ED from home as observation status for sickle cell anemia with pain  Patient c/o sharp pain that starts in her lower back, extends up the her back into her chest   Pain started gradually 6 hours ago after lifting and moving things earlier today  Pain is rated as severe, worse with movement she states that feels similar to prior sickle cell attacks  On exam ill appearing  Plan IVF,  Monitor HGB,IV pain medication as needed and supportive care            ED Triage Vitals   Temperature Pulse Respirations Blood Pressure SpO2   07/10/21 1842 07/10/21 1840 07/10/21 1840 07/10/21 1840 07/10/21 1840   98 °F (36 7 °C) 105 18 144/78 100 %      Temp Source Heart Rate Source Patient Position - Orthostatic VS BP Location FiO2 (%)   07/10/21 1842 -- 07/10/21 1840 07/10/21 1840 --   Oral  Sitting Right arm       Pain Score       07/10/21 1840       Worst Possible Pain          Wt Readings from Last 1 Encounters:   07/11/21 83 7 kg (184 lb 8 4 oz)     Additional Vital Signs:   Pertinent Labs/Diagnostic Test Results:       Results from last 7 days   Lab Units 07/11/21  0755 07/10/21  1913   WBC Thousand/uL 7 16 13 48*   HEMOGLOBIN g/dL 9 8* 11 1*   HEMATOCRIT % 29 7* 33 7*   PLATELETS Thousands/uL 172 251   NEUTROS ABS Thousands/µL 4 92  --    BANDS PCT %  --  4     Results from last 7 days   Lab Units 07/10/21  1913   RETIC CT ABS  137,000*   RETIC CT PCT % 2 74*     Results from last 7 days   Lab Units 07/11/21  0755 07/10/21  1913   SODIUM mmol/L 139 141   POTASSIUM mmol/L 3 4* 3 5   CHLORIDE mmol/L 105 103   CO2 mmol/L 27 28   ANION GAP mmol/L 7 10   BUN mg/dL 7 8   CREATININE mg/dL 0 69 0 76   EGFR ml/min/1 73sq m 123 111   CALCIUM mg/dL 8 3 8 6     Results from last 7 days   Lab Units 07/10/21  1913   AST U/L 30   ALT U/L 32   ALK PHOS U/L 48   TOTAL PROTEIN g/dL 8 1   ALBUMIN g/dL 4 0   TOTAL BILIRUBIN mg/dL 0 76     Results from last 7 days   Lab Units 07/11/21  0723 07/10/21  2208   POC GLUCOSE mg/dl 110 109     Results from last 7 days   Lab Units 07/11/21  0755 07/10/21  1913   GLUCOSE RANDOM mg/dL 104 120       Results from last 7 days   Lab Units 07/10/21  1913   TROPONIN I ng/mL <0 02       Results from last 7 days   Lab Units 07/10/21  2045   CLARITY UA  Cloudy   COLOR UA  Yellow   SPEC GRAV UA  1 020   PH UA  5 5   GLUCOSE UA mg/dl Negative   KETONES UA mg/dl 40 (2+)*   BLOOD UA  Trace*   PROTEIN UA mg/dl Negative   NITRITE UA  Negative   BILIRUBIN UA  Negative   UROBILINOGEN UA E U /dl 1 0   LEUKOCYTES UA  Negative   WBC UA /hpf 0-1*   RBC UA /hpf 0-1*   BACTERIA UA /hpf Innumerable*   EPITHELIAL CELLS WET PREP /hpf Occasional       Results from last 7 days   Lab Units 07/10/21  1913   TOTAL COUNTED  100           ED Treatment:   Medication Administration from 07/10/2021 1825 to 07/10/2021 2139       Date/Time Order Dose Route Action     07/10/2021 1918 sodium chloride 0 9 % bolus 1,000 mL 1,000 mL Intravenous New Bag     07/10/2021 1918 morphine (PF) 4 mg/mL injection 4 mg 4 mg Intravenous Given     07/10/2021 1913 ondansetron (ZOFRAN) injection 4 mg 4 mg Intravenous Given     07/10/2021 2008 morphine (PF) 4 mg/mL injection 4 mg 4 mg Intravenous Given        Past Medical History:   Diagnosis Date    Sickle cell disease ([facility] [address] )      Present on Admission:  **None**      Admitting Diagnosis: Back pain [M54 9]  Chest pain [R07 9]  Sickle cell pain crisis (HCC) [D57 00]  Acute back pain [M54 9]  Age/Sex: 35 y o  female  Admission Orders:  Scheduled Medications:  insulin lispro, 1-5 Units, Subcutaneous, TID AC  insulin lispro, 1-5 Units, Subcutaneous, HS      Continuous IV Infusions:  sodium chloride, 75 mL/hr, Intravenous, Continuous      PRN Meds:  acetaminophen, 650 mg, Oral, Q6H PRN  morphine injection, 2 mg, Intravenous, Q3H PRN   Or  morphine injection, 4 mg, Intravenous, Q3H PRN      X  4   ondansetron, 4 mg, Intravenous, Q6H PRN         IP CONSULT TO CASE MANAGEMENT   Blood sugars ac and hs  Telemetry      Network Utilization Review Department  ATTENTION: Please call with any questions or concerns to [phone] and carefully listen to the prompts so that you are directed to the right person  All voicemails are confidential   Bubba Wei all requests for admission clinical reviews, approved or denied determinations and any other requests to dedicated fax number below belonging to the campus where the patient is receiving treatment   List of dedicated fax numbers for the Facilities:  [address] DENIALS (Administrative/Medical Necessity) [phone]   1000  [facility] (Maternity/NICU/Pediatrics) [phone] 401 [address] [phone] 601 [address] Daphney Alexander 7[address] [place] [address] [phone]     Imer Falk [address] [place] [address] [phone]

## 2021-07-12 ENCOUNTER — APPOINTMENT (INPATIENT)
Dept: RADIOLOGY | Facility: HOSPITAL | Age: 41
DRG: 812 | End: 2021-07-12
Payer: COMMERCIAL

## 2021-07-12 PROBLEM — D72.829 ELEVATED WBC COUNT: Status: RESOLVED | Noted: 2021-07-10 | Resolved: 2021-07-12

## 2021-07-12 LAB
ALBUMIN SERPL BCP-MCNC: 3.1 G/DL (ref 3.5–5)
ALP SERPL-CCNC: 44 U/L (ref 46–116)
ALT SERPL W P-5'-P-CCNC: 26 U/L (ref 12–78)
ANION GAP SERPL CALCULATED.3IONS-SCNC: 11 MMOL/L (ref 4–13)
AST SERPL W P-5'-P-CCNC: 29 U/L (ref 5–45)
BASOPHILS # BLD AUTO: 0.02 THOUSANDS/ΜL (ref 0–0.1)
BASOPHILS NFR BLD AUTO: 0 % (ref 0–1)
BILIRUB SERPL-MCNC: 1.03 MG/DL (ref 0.2–1)
BUN SERPL-MCNC: 4 MG/DL (ref 5–25)
CALCIUM ALBUM COR SERPL-MCNC: 9.1 MG/DL (ref 8.3–10.1)
CALCIUM SERPL-MCNC: 8.4 MG/DL (ref 8.3–10.1)
CHLORIDE SERPL-SCNC: 104 MMOL/L (ref 100–108)
CO2 SERPL-SCNC: 24 MMOL/L (ref 21–32)
CREAT SERPL-MCNC: 0.64 MG/DL (ref 0.6–1.3)
EOSINOPHIL # BLD AUTO: 0.06 THOUSAND/ΜL (ref 0–0.61)
EOSINOPHIL NFR BLD AUTO: 1 % (ref 0–6)
ERYTHROCYTE [DISTWIDTH] IN BLOOD BY AUTOMATED COUNT: 16 % (ref 11.6–15.1)
GFR SERPL CREATININE-BSD FRML MDRD: 126 ML/MIN/1.73SQ M
GLUCOSE P FAST SERPL-MCNC: 99 MG/DL (ref 65–99)
GLUCOSE SERPL-MCNC: 103 MG/DL (ref 65–140)
GLUCOSE SERPL-MCNC: 86 MG/DL (ref 65–140)
GLUCOSE SERPL-MCNC: 92 MG/DL (ref 65–140)
GLUCOSE SERPL-MCNC: 96 MG/DL (ref 65–140)
GLUCOSE SERPL-MCNC: 99 MG/DL (ref 65–140)
HCT VFR BLD AUTO: 29.3 % (ref 34.8–46.1)
HGB BLD-MCNC: 9.5 G/DL (ref 11.5–15.4)
IMM GRANULOCYTES # BLD AUTO: 0.07 THOUSAND/UL (ref 0–0.2)
IMM GRANULOCYTES NFR BLD AUTO: 1 % (ref 0–2)
LDH SERPL-CCNC: 378 U/L (ref 81–234)
LYMPHOCYTES # BLD AUTO: 1.63 THOUSANDS/ΜL (ref 0.6–4.47)
LYMPHOCYTES NFR BLD AUTO: 21 % (ref 14–44)
MAGNESIUM SERPL-MCNC: 2 MG/DL (ref 1.6–2.6)
MCH RBC QN AUTO: 21.9 PG (ref 26.8–34.3)
MCHC RBC AUTO-ENTMCNC: 32.4 G/DL (ref 31.4–37.4)
MCV RBC AUTO: 68 FL (ref 82–98)
MONOCYTES # BLD AUTO: 0.88 THOUSAND/ΜL (ref 0.17–1.22)
MONOCYTES NFR BLD AUTO: 12 % (ref 4–12)
NEUTROPHILS # BLD AUTO: 5 THOUSANDS/ΜL (ref 1.85–7.62)
NEUTS SEG NFR BLD AUTO: 65 % (ref 43–75)
NRBC BLD AUTO-RTO: 0 /100 WBCS
PLATELET # BLD AUTO: 167 THOUSANDS/UL (ref 149–390)
PMV BLD AUTO: 9.9 FL (ref 8.9–12.7)
POTASSIUM SERPL-SCNC: 3.3 MMOL/L (ref 3.5–5.3)
PROCALCITONIN SERPL-MCNC: <0.05 NG/ML
PROT SERPL-MCNC: 7 G/DL (ref 6.4–8.2)
RBC # BLD AUTO: 4.34 MILLION/UL (ref 3.81–5.12)
SODIUM SERPL-SCNC: 139 MMOL/L (ref 136–145)
WBC # BLD AUTO: 7.66 THOUSAND/UL (ref 4.31–10.16)

## 2021-07-12 PROCEDURE — 82948 REAGENT STRIP/BLOOD GLUCOSE: CPT

## 2021-07-12 PROCEDURE — 80053 COMPREHEN METABOLIC PANEL: CPT | Performed by: STUDENT IN AN ORGANIZED HEALTH CARE EDUCATION/TRAINING PROGRAM

## 2021-07-12 PROCEDURE — 83735 ASSAY OF MAGNESIUM: CPT | Performed by: STUDENT IN AN ORGANIZED HEALTH CARE EDUCATION/TRAINING PROGRAM

## 2021-07-12 PROCEDURE — 72170 X-RAY EXAM OF PELVIS: CPT

## 2021-07-12 PROCEDURE — 99232 SBSQ HOSP IP/OBS MODERATE 35: CPT | Performed by: STUDENT IN AN ORGANIZED HEALTH CARE EDUCATION/TRAINING PROGRAM

## 2021-07-12 PROCEDURE — 83615 LACTATE (LD) (LDH) ENZYME: CPT | Performed by: STUDENT IN AN ORGANIZED HEALTH CARE EDUCATION/TRAINING PROGRAM

## 2021-07-12 PROCEDURE — 84145 PROCALCITONIN (PCT): CPT | Performed by: STUDENT IN AN ORGANIZED HEALTH CARE EDUCATION/TRAINING PROGRAM

## 2021-07-12 PROCEDURE — 73560 X-RAY EXAM OF KNEE 1 OR 2: CPT

## 2021-07-12 PROCEDURE — 83010 ASSAY OF HAPTOGLOBIN QUANT: CPT | Performed by: STUDENT IN AN ORGANIZED HEALTH CARE EDUCATION/TRAINING PROGRAM

## 2021-07-12 PROCEDURE — 85025 COMPLETE CBC W/AUTO DIFF WBC: CPT | Performed by: STUDENT IN AN ORGANIZED HEALTH CARE EDUCATION/TRAINING PROGRAM

## 2021-07-12 RX ORDER — POTASSIUM CHLORIDE 20MEQ/15ML
20 LIQUID (ML) ORAL ONCE
Status: COMPLETED | OUTPATIENT
Start: 2021-07-12 | End: 2021-07-12

## 2021-07-12 RX ADMIN — MORPHINE SULFATE 4 MG: 4 INJECTION INTRAVENOUS at 08:08

## 2021-07-12 RX ADMIN — MORPHINE SULFATE 4 MG: 4 INJECTION INTRAVENOUS at 13:15

## 2021-07-12 RX ADMIN — MORPHINE SULFATE 4 MG: 4 INJECTION INTRAVENOUS at 17:10

## 2021-07-12 RX ADMIN — MORPHINE SULFATE 4 MG: 4 INJECTION INTRAVENOUS at 22:03

## 2021-07-12 RX ADMIN — MORPHINE SULFATE 4 MG: 4 INJECTION INTRAVENOUS at 02:34

## 2021-07-12 RX ADMIN — POTASSIUM CHLORIDE 20 MEQ: 20 SOLUTION ORAL at 17:09

## 2021-07-12 RX ADMIN — SODIUM CHLORIDE 75 ML/HR: 0.45 INJECTION, SOLUTION INTRAVENOUS at 18:17

## 2021-07-12 RX ADMIN — SODIUM CHLORIDE 75 ML/HR: 0.45 INJECTION, SOLUTION INTRAVENOUS at 06:08

## 2021-07-12 NOTE — PLAN OF CARE
Problem: Potential for Falls  Goal: Patient will remain free of falls  Description: INTERVENTIONS:  - Educate patient/family on patient safety including physical limitations  - Instruct patient to call for assistance with activity   - Consult OT/PT to assist with strengthening/mobility   - Keep Call bell within reach  - Keep bed low and locked with side rails adjusted as appropriate  - Keep care items and personal belongings within reach  - Initiate and maintain comfort rounds  - Make Fall Risk Sign visible to staff  - Offer Toileting every  Hours, in advance of need  - Initiate/Maintain alarm  - Obtain necessary fall risk management equipment:   - Apply yellow socks and bracelet for high fall risk patients  - Consider moving patient to room near nurses station  Outcome: Progressing     Problem: PAIN - ADULT  Goal: Verbalizes/displays adequate comfort level or baseline comfort level  Description: Interventions:  - Encourage patient to monitor pain and request assistance  - Assess pain using appropriate pain scale  - Administer analgesics based on type and severity of pain and evaluate response  - Implement non-pharmacological measures as appropriate and evaluate response  - Consider cultural and social influences on pain and pain management  - Notify physician/advanced practitioner if interventions unsuccessful or patient reports new pain  Outcome: Progressing     Problem: INFECTION - ADULT  Goal: Absence or prevention of progression during hospitalization  Description: INTERVENTIONS:  - Assess and monitor for signs and symptoms of infection  - Monitor lab/diagnostic results  - Monitor all insertion sites, i e  indwelling lines, tubes, and drains  - Monitor endotracheal if appropriate and nasal secretions for changes in amount and color  - [place] appropriate cooling/warming therapies per order  - Administer medications as ordered  - Instruct and encourage patient and family to use good hand hygiene technique  - Identify and instruct in appropriate isolation precautions for identified infection/condition  Outcome: Progressing  Goal: Absence of fever/infection during neutropenic period  Description: INTERVENTIONS:  - Monitor WBC    Outcome: Progressing     Problem: SAFETY ADULT  Goal: Maintain or return to baseline ADL function  Description: INTERVENTIONS:  -  Assess patient's ability to carry out ADLs; assess patient's baseline for ADL function and identify physical deficits which impact ability to perform ADLs (bathing, care of mouth/teeth, toileting, grooming, dressing, etc )  - Assess/evaluate cause of self-care deficits   - Assess range of motion  - Assess patient's mobility; develop plan if impaired  - Assess patient's need for assistive devices and provide as appropriate  - Encourage maximum independence but intervene and supervise when necessary  - Involve family in performance of ADLs  - Assess for home care needs following discharge   - Consider OT consult to assist with ADL evaluation and planning for discharge  - Provide patient education as appropriate  Outcome: Progressing  Goal: Maintains/Returns to pre admission functional level  Description: INTERVENTIONS:  - Perform BMAT or MOVE assessment daily    - Set and communicate daily mobility goal to care team and patient/family/caregiver  - Collaborate with rehabilitation services on mobility goals if consulted  - Perform Range of Motion times a day  - Reposition patient every  hours    - Dangle patient  times a day  - Stand patient  times a day  - Ambulate patient  times a day  - Out of bed to chair  times a day   - Out of bed for meals  times a day  - Out of bed for toileting  - Record patient progress and toleration of activity level   Outcome: Progressing     Problem: DISCHARGE PLANNING  Goal: Discharge to home or other facility with appropriate resources  Description: INTERVENTIONS:  - Identify barriers to discharge w/patient and caregiver  - Arrange for needed discharge resources and transportation as appropriate  - Identify discharge learning needs (meds, wound care, etc )  - Arrange for interpretive services to assist at discharge as needed  - Refer to Case Management Department for coordinating discharge planning if the patient needs post-hospital services based on physician/advanced practitioner order or complex needs related to functional status, cognitive ability, or social support system  Outcome: Progressing     Problem: Knowledge Deficit  Goal: Patient/family/caregiver demonstrates understanding of disease process, treatment plan, medications, and discharge instructions  Description: Complete learning assessment and assess knowledge base    Interventions:  - Provide teaching at level of understanding  - Provide teaching via preferred learning methods  Outcome: Progressing     Problem: HEMATOLOGIC - ADULT  Goal: Maintains hematologic stability  Description: INTERVENTIONS  - Assess for signs and symptoms of bleeding or hemorrhage  - Monitor labs  - Administer supportive blood products/factors as ordered and appropriate  Outcome: Progressing     Problem: METABOLIC, FLUID AND ELECTROLYTES - ADULT  Goal: Electrolytes maintained within normal limits  Description: INTERVENTIONS:  - Monitor labs and assess patient for signs and symptoms of electrolyte imbalances  - Administer electrolyte replacement as ordered  - Monitor response to electrolyte replacements, including repeat lab results as appropriate  - Instruct patient on fluid and nutrition as appropriate  Outcome: Progressing

## 2021-07-12 NOTE — UTILIZATION REVIEW
Continued Stay Review   7/12/2021    Admission Orders (From admission, onward)     Ordered        07/12/21 1809  Inpatient Admission  Once         07/10/21 2116  Place in Observation  Once                   7/12   CHANGED TO INPT status due to ongoing need for IVF  IV Pain control    Inpatient Admission Once     Transfer Service: Hospitalist       Question Answer   Level of Care Med Surg   Estimated length of stay More than 2 Midnights   Certification I certify that inpatient services are medically necessary for this patient for a duration of greater than two midnights  See H&P and MD Progress Notes for additional information about the patient's course of treatment  Current Patient Class: IP   Current Level of Care: ms     HPI:41 y o  female initially admitted on 7/10  For pain management of  Sickle Cell crisis  Performing daily labs, providing IVF  and  IV pain control  (MS 4mg prn)     7/11  Received IV MS x6      7/12  Pt is actively sickling, states extreme pain but does get temporary relief w/IV dilaudid/  Cont IV hydration and daily labs  Leukocytosis wbc resolved         Has received IV MS @  0234,  [id number],  1315,  1710      Vital Signs:   07/10/21 2137 97 9 °F (36 6 °C) 98 20 143/84 98 %   07/10/21 2045 -- 96 18 148/88 100 %     07/12/21 14:35:43  98 1 °F (36 7 °C)  93  16  108/68  81  99 %   07/12/21 07:38:43  98 6 °F (37 °C)  99  16  106/66  79  97 %   07/12/21 0215  --  111  --  99/64  76  97 %   07/12/21 0145  97 5 °F (36 4 °C)  --  --  --  --  --   07/11/21 21:53:49  101 9 °F   101  18  115/75  88  98 %   07/11/21 15:23:06  99 1 °F (37 3 °C)  96  18  109/76  87  98 %   07/11/21 07:18:02  100 4 °F (38 °C)  97  18  110/76  87  94 %     Pertinent Labs/Diagnostic Results:   7/11  CXR -  nad  7/11  EKG - sinus tach         Results from last 7 days   Lab Units 07/12/21  0605 07/11/21  0755 07/10/21  1913   WBC Thousand/uL 7 66 7 16 13 48*   HEMOGLOBIN g/dL 9 5* 9 8* 11 1*   HEMATOCRIT % 29 3* 29 7* 33 7*   PLATELETS Thousands/uL 167 172 251   NEUTROS ABS Thousands/µL 5 00 4 92  --    BANDS PCT %  --   --  4     Results from last 7 days   Lab Units 07/10/21  1913   RETIC CT ABS  137,000*   RETIC CT PCT % 2 74*     Results from last 7 days   Lab Units 07/12/21  0605 07/11/21  0755 07/10/21  1913   SODIUM mmol/L 139 139 141   POTASSIUM mmol/L 3 3* 3 4* 3 5   CHLORIDE mmol/L 104 105 103   CO2 mmol/L 24 27 28   ANION GAP mmol/L 11 7 10   BUN mg/dL 4* 7 8   CREATININE mg/dL 0 64 0 69 0 76   EGFR ml/min/1 73sq m 126 123 111   CALCIUM mg/dL 8 4 8 3 8 6   MAGNESIUM mg/dL 2 0  --   --      Results from last 7 days   Lab Units 07/12/21  0605 07/10/21  1913   AST U/L 29 30   ALT U/L 26 32   ALK PHOS U/L 44* 48   TOTAL PROTEIN g/dL 7 0 8 1   ALBUMIN g/dL 3 1* 4 0   TOTAL BILIRUBIN mg/dL 1 03* 0 76     Results from last 7 days   Lab Units 07/12/21  0744 07/11/21  2053 07/11/21  1702 07/11/21  1118 07/11/21  0723 07/10/21  2208   POC GLUCOSE mg/dl 92 102 95 152* 110 109     Results from last 7 days   Lab Units 07/12/21  0605 07/11/21  0755 07/10/21  1913   GLUCOSE RANDOM mg/dL 99 104 120         Results from last 7 days   Lab Units 07/11/21  0755   HEMOGLOBIN A1C % 4 7   EAG mg/dl 88     Results from last 7 days   Lab Units 07/10/21  1913   TROPONIN I ng/mL <0 02     Results from last 7 days   Lab Units 07/10/21  2045   CLARITY UA  Cloudy   COLOR UA  Yellow   SPEC GRAV UA  1 020   PH UA  5 5   GLUCOSE UA mg/dl Negative   KETONES UA mg/dl 40 (2+)*   BLOOD UA  Trace*   PROTEIN UA mg/dl Negative   NITRITE UA  Negative   BILIRUBIN UA  Negative   UROBILINOGEN UA E U /dl 1 0   LEUKOCYTES UA  Negative   WBC UA /hpf 0-1*   RBC UA /hpf 0-1*   BACTERIA UA /hpf Innumerable*   EPITHELIAL CELLS WET PREP /hpf Occasional     Medications:   Scheduled Medications:  insulin lispro, 1-5 Units, Subcutaneous, TID AC  insulin lispro, 1-5 Units, Subcutaneous, HS      Continuous IV Infusions:  sodium chloride, 75 mL/hr, Intravenous, Continuous      PRN Meds:  acetaminophen, 650 mg, Oral, Q6H PRN  morphine injection, 2 mg, Intravenous, Q3H PRN   Or  morphine injection, 4 mg, Intravenous, Q3H PRN  ondansetron, 4 mg, Intravenous, Q6H PRN        Discharge Plan: [facility]     Network Utilization Review Department  ATTENTION: Please call with any questions or concerns to [phone] and carefully listen to the prompts so that you are directed to the right person  All voicemails are confidential   Lizet Vale all requests for admission clinical reviews, approved or denied determinations and any other requests to dedicated fax number below belonging to the campus where the patient is receiving treatment   List of dedicated fax numbers for the Facilities:  [address] DENIALS (Administrative/Medical Necessity) [phone]   [address] (Maternity/NICU/Pediatrics) [phone]   [address] [place] [address] [address] [place] [address] [address] [place] [address] [address] [facility] 951 [phone]

## 2021-07-12 NOTE — ASSESSMENT & PLAN NOTE
Possibly due to acute crisis  Resolved      Recent Labs     07/10/21  1913 07/11/21  0755 07/12/21  0605   WBC 13 48* 7 16 7 66

## 2021-07-12 NOTE — ASSESSMENT & PLAN NOTE
Lab Results   Component Value Date    HGBA1C 4 7 07/11/2021       Recent Labs     07/11/21 2053 07/12/21  0744 07/12/21  1114 07/12/21  1557   POCGLU 102 92 96 103       Blood Sugar Average: Last 72 hrs:  (P) 107 375    Not on medications  Diabetic diet  Sliding scale

## 2021-07-12 NOTE — ASSESSMENT & PLAN NOTE
35year old female presenting with sickle cell crisis  Not hypoxic  Knee and hip pain    - XR ordered, pending  - Trend labs  - Pain control  - IV hydration     Recent Labs     07/10/21  1913 07/11/21  0755 07/12/21  0605   WBC 13 48* 7 16 7 66   HGB 11 1* 9 8* 9 5*   LDH  --  387* 378*   TBILI 0 76  --  1 03*

## 2021-07-12 NOTE — PROGRESS NOTES
[address] [facility]  Progress Note - Esther Rice 1980, 35 y o  female MRN: [id number]  Unit/Bed#: [place] 68 2 [facility] 87 221-01 Encounter: [id number]  Primary Care Provider: No primary care provider on file  Date and time admitted to hospital: 7/10/2021  6:43 PM    * Sickle cell anemia with pain [facility])  Assessment & Plan  35year old female presenting with sickle cell crisis  Not hypoxic  Knee and hip pain  - XR ordered, pending  - Trend labs  - Pain control  - IV hydration     Recent Labs     07/10/21  1913 07/11/21  0755 07/12/21  0605   WBC 13 48* 7 16 7 66   HGB 11 1* 9 8* 9 5*   LDH  --  387* 378*   TBILI 0 76  --  1 03*         Diet-controlled diabetes mellitus [facility])  Assessment & Plan  Lab Results   Component Value Date    HGBA1C 4 7 07/11/2021       Recent Labs     07/11/21  2053 07/12/21  0744 07/12/21  1114 07/12/21  1557   POCGLU 102 92 96 103       Blood Sugar Average: Last 72 hrs:  (P) 107 375    Not on medications  Diabetic diet  Sliding scale  Elevated WBC count-resolved as of 7/12/2021  Assessment & Plan  Possibly due to acute crisis  Resolved  Recent Labs     07/10/21  1913 07/11/21  0755 07/12/21  0605   WBC 13 48* 7 16 7 66           VTE Pharmacologic Prophylaxis:   Pharmacologic: Enoxaparin (Lovenox)  Mechanical VTE Prophylaxis in Place: Yes    Patient Centered Rounds: I have performed bedside rounds with nursing staff today  Discussions with Specialists or Other Care Team Provider: nursing    Education and Discussions with Family / Patient: patient    Time Spent for Care: 30 minutes  More than 50% of total time spent on counseling and coordination of care as described above      Current Length of Stay: 0 day(s)    Current Patient Status: Observation   Certification Statement: The patient will continue to require additional inpatient hospital stay due to intractable pain due to sickle cell crisis, iv hydration    Discharge Plan: active    Code Status: Level 1 - Full Code      Subjective:   Patient seen and examined at bedside  She is actively sickling and in significant pain    Objective:     Vitals:   Temp (24hrs), Av °F (37 2 °C), Min:97 5 °F (36 4 °C), Max:101 9 °F (38 8 °C)    Temp:  [97 5 °F (36 4 °C)-101 9 °F (38 8 °C)] 98 1 °F (36 7 °C)  HR:  [] 93  Resp:  [16-18] 16  BP: ()/(64-75) 108/68  SpO2:  [97 %-99 %] 99 %  There is no height or weight on file to calculate BMI  Input and Output Summary (last 24 hours): Intake/Output Summary (Last 24 hours) at 2021 1615  Last data filed at 2021 5156  Gross per 24 hour   Intake 943 75 ml   Output 350 ml   Net 593 75 ml       Physical Exam:     Physical Exam  Vitals reviewed  Constitutional:       General: She is not in acute distress  HENT:      Head: Normocephalic  Nose: Nose normal       Mouth/Throat:      Mouth: Mucous membranes are moist    Eyes:      General: No scleral icterus  Cardiovascular:      Rate and Rhythm: Normal rate and regular rhythm  Pulmonary:      Effort: Pulmonary effort is normal  No respiratory distress  Breath sounds: No wheezing or rales  Abdominal:      General: There is no distension  Palpations: Abdomen is soft  Tenderness: There is no abdominal tenderness  Skin:     General: Skin is warm  Neurological:      Mental Status: She is alert  Mental status is at baseline     Psychiatric:         Mood and Affect: Mood normal          Behavior: Behavior normal        Additional Data:     Labs:    Results from last 7 days   Lab Units 21  0605 07/10/21  1913   WBC Thousand/uL 7 66 13 48*   HEMOGLOBIN g/dL 9 5* 11 1*   HEMATOCRIT % 29 3* 33 7*   PLATELETS Thousands/uL 167 251   BANDS PCT %  --  4   NEUTROS PCT % 65  --    LYMPHS PCT % 21  --    LYMPHO PCT %  --  9*   MONOS PCT % 12  --    MONO PCT %  --  8   EOS PCT % 1 0     Results from last 7 days   Lab Units 21  0605   SODIUM mmol/L 139   POTASSIUM mmol/L 3 3*   CHLORIDE mmol/L 104 CO2 mmol/L 24   BUN mg/dL 4*   CREATININE mg/dL 0 64   ANION GAP mmol/L 11   CALCIUM mg/dL 8 4   ALBUMIN g/dL 3 1*   TOTAL BILIRUBIN mg/dL 1 03*   ALK PHOS U/L 44*   ALT U/L 26   AST U/L 29   GLUCOSE RANDOM mg/dL 99         Results from last 7 days   Lab Units 07/12/21  1557 07/12/21  1114 07/12/21  0744 07/11/21  2053 07/11/21  1702 07/11/21  1118 07/11/21  0723 07/10/21  2208   POC GLUCOSE mg/dl 103 96 92 102 95 152* 110 109     Results from last 7 days   Lab Units 07/11/21  0755   HEMOGLOBIN A1C % 4 7     Results from last 7 days   Lab Units 07/12/21  0605   PROCALCITONIN ng/ml <0 05           * I Have Reviewed All Lab Data Listed Above  * Additional Pertinent Lab Tests Reviewed: [facility] Admission Reviewed    Imaging:    Imaging Reports Reviewed Today Include: xr chest    Recent Cultures (last 7 days):           Last 24 Hours Medication List:   Current Facility-Administered Medications   Medication Dose Route Frequency Provider Last Rate    acetaminophen  650 mg Oral Q6H PRN Susana Piper MD      insulin lispro  1-5 Units Subcutaneous TID AC Susana Piper MD      insulin lispro  1-5 Units Subcutaneous HS Susana Piper MD      morphine injection  2 mg Intravenous Q3H PRN Susana Piper MD      Or    morphine injection  4 mg Intravenous Q3H PRN Susana Piper MD      ondansetron  4 mg Intravenous Q6H PRN Susana Piper MD      potassium chloride  20 mEq Oral Once Goyo Flood MD      sodium chloride  75 mL/hr Intravenous Continuous Goyo Flood MD 75 mL/hr (07/12/21 9369)        Today, Patient Was Seen By: Roxanne Calvo MD    ** Please Note: Dictation voice to text software may have been used in the creation of this document   **

## 2021-07-13 PROBLEM — D64.9 ANEMIA: Status: ACTIVE | Noted: 2021-07-13

## 2021-07-13 PROBLEM — D57.1 SICKLE CELL ANEMIA (HCC): Status: ACTIVE | Noted: 2021-07-13

## 2021-07-13 LAB
ALBUMIN SERPL BCP-MCNC: 2.8 G/DL (ref 3.5–5)
ALP SERPL-CCNC: 42 U/L (ref 46–116)
ALT SERPL W P-5'-P-CCNC: 21 U/L (ref 12–78)
ANION GAP SERPL CALCULATED.3IONS-SCNC: 9 MMOL/L (ref 4–13)
AST SERPL W P-5'-P-CCNC: 26 U/L (ref 5–45)
BASOPHILS # BLD AUTO: 0.02 THOUSANDS/ΜL (ref 0–0.1)
BASOPHILS NFR BLD AUTO: 0 % (ref 0–1)
BILIRUB DIRECT SERPL-MCNC: 0.24 MG/DL (ref 0–0.2)
BILIRUB SERPL-MCNC: 0.68 MG/DL (ref 0.2–1)
BUN SERPL-MCNC: 4 MG/DL (ref 5–25)
CALCIUM ALBUM COR SERPL-MCNC: 9.2 MG/DL (ref 8.3–10.1)
CALCIUM SERPL-MCNC: 8.2 MG/DL (ref 8.3–10.1)
CHLORIDE SERPL-SCNC: 105 MMOL/L (ref 100–108)
CO2 SERPL-SCNC: 25 MMOL/L (ref 21–32)
CREAT SERPL-MCNC: 0.6 MG/DL (ref 0.6–1.3)
EOSINOPHIL # BLD AUTO: 0.1 THOUSAND/ΜL (ref 0–0.61)
EOSINOPHIL NFR BLD AUTO: 2 % (ref 0–6)
ERYTHROCYTE [DISTWIDTH] IN BLOOD BY AUTOMATED COUNT: 16 % (ref 11.6–15.1)
GFR SERPL CREATININE-BSD FRML MDRD: 129 ML/MIN/1.73SQ M
GLUCOSE SERPL-MCNC: 81 MG/DL (ref 65–140)
GLUCOSE SERPL-MCNC: 82 MG/DL (ref 65–140)
GLUCOSE SERPL-MCNC: 85 MG/DL (ref 65–140)
GLUCOSE SERPL-MCNC: 90 MG/DL (ref 65–140)
GLUCOSE SERPL-MCNC: 93 MG/DL (ref 65–140)
HAPTOGLOB SERPL-MCNC: 82 MG/DL (ref 42–296)
HCT VFR BLD AUTO: 27.9 % (ref 34.8–46.1)
HGB BLD-MCNC: 9.1 G/DL (ref 11.5–15.4)
IMM GRANULOCYTES # BLD AUTO: 0.04 THOUSAND/UL (ref 0–0.2)
IMM GRANULOCYTES NFR BLD AUTO: 1 % (ref 0–2)
LDH SERPL-CCNC: 316 U/L (ref 81–234)
LYMPHOCYTES # BLD AUTO: 1.72 THOUSANDS/ΜL (ref 0.6–4.47)
LYMPHOCYTES NFR BLD AUTO: 27 % (ref 14–44)
MAGNESIUM SERPL-MCNC: 2 MG/DL (ref 1.6–2.6)
MCH RBC QN AUTO: 22 PG (ref 26.8–34.3)
MCHC RBC AUTO-ENTMCNC: 32.6 G/DL (ref 31.4–37.4)
MCV RBC AUTO: 68 FL (ref 82–98)
MONOCYTES # BLD AUTO: 0.63 THOUSAND/ΜL (ref 0.17–1.22)
MONOCYTES NFR BLD AUTO: 10 % (ref 4–12)
NEUTROPHILS # BLD AUTO: 3.79 THOUSANDS/ΜL (ref 1.85–7.62)
NEUTS SEG NFR BLD AUTO: 60 % (ref 43–75)
NRBC BLD AUTO-RTO: 0 /100 WBCS
PLATELET # BLD AUTO: 145 THOUSANDS/UL (ref 149–390)
PMV BLD AUTO: 10.7 FL (ref 8.9–12.7)
POTASSIUM SERPL-SCNC: 3.5 MMOL/L (ref 3.5–5.3)
PROCALCITONIN SERPL-MCNC: 0.05 NG/ML
PROT SERPL-MCNC: 6.8 G/DL (ref 6.4–8.2)
RBC # BLD AUTO: 4.13 MILLION/UL (ref 3.81–5.12)
SODIUM SERPL-SCNC: 139 MMOL/L (ref 136–145)
WBC # BLD AUTO: 6.3 THOUSAND/UL (ref 4.31–10.16)

## 2021-07-13 PROCEDURE — 99222 1ST HOSP IP/OBS MODERATE 55: CPT | Performed by: INTERNAL MEDICINE

## 2021-07-13 PROCEDURE — 83735 ASSAY OF MAGNESIUM: CPT | Performed by: STUDENT IN AN ORGANIZED HEALTH CARE EDUCATION/TRAINING PROGRAM

## 2021-07-13 PROCEDURE — 99232 SBSQ HOSP IP/OBS MODERATE 35: CPT | Performed by: STUDENT IN AN ORGANIZED HEALTH CARE EDUCATION/TRAINING PROGRAM

## 2021-07-13 PROCEDURE — 85025 COMPLETE CBC W/AUTO DIFF WBC: CPT | Performed by: STUDENT IN AN ORGANIZED HEALTH CARE EDUCATION/TRAINING PROGRAM

## 2021-07-13 PROCEDURE — 83615 LACTATE (LD) (LDH) ENZYME: CPT | Performed by: STUDENT IN AN ORGANIZED HEALTH CARE EDUCATION/TRAINING PROGRAM

## 2021-07-13 PROCEDURE — 82948 REAGENT STRIP/BLOOD GLUCOSE: CPT

## 2021-07-13 PROCEDURE — 80053 COMPREHEN METABOLIC PANEL: CPT | Performed by: STUDENT IN AN ORGANIZED HEALTH CARE EDUCATION/TRAINING PROGRAM

## 2021-07-13 PROCEDURE — 84145 PROCALCITONIN (PCT): CPT | Performed by: STUDENT IN AN ORGANIZED HEALTH CARE EDUCATION/TRAINING PROGRAM

## 2021-07-13 PROCEDURE — 82248 BILIRUBIN DIRECT: CPT | Performed by: STUDENT IN AN ORGANIZED HEALTH CARE EDUCATION/TRAINING PROGRAM

## 2021-07-13 RX ADMIN — MORPHINE SULFATE 4 MG: 4 INJECTION INTRAVENOUS at 18:29

## 2021-07-13 RX ADMIN — MORPHINE SULFATE 4 MG: 4 INJECTION INTRAVENOUS at 06:25

## 2021-07-13 RX ADMIN — MORPHINE SULFATE 4 MG: 4 INJECTION INTRAVENOUS at 22:38

## 2021-07-13 RX ADMIN — MORPHINE SULFATE 4 MG: 4 INJECTION INTRAVENOUS at 09:55

## 2021-07-13 RX ADMIN — ENOXAPARIN SODIUM 40 MG: 40 INJECTION SUBCUTANEOUS at 08:38

## 2021-07-13 RX ADMIN — SODIUM CHLORIDE 75 ML/HR: 0.45 INJECTION, SOLUTION INTRAVENOUS at 19:30

## 2021-07-13 RX ADMIN — MORPHINE SULFATE 4 MG: 4 INJECTION INTRAVENOUS at 13:09

## 2021-07-13 RX ADMIN — SODIUM CHLORIDE 75 ML/HR: 0.45 INJECTION, SOLUTION INTRAVENOUS at 06:23

## 2021-07-13 NOTE — ASSESSMENT & PLAN NOTE
35year old female presenting with sickle cell crisis  Still actively sickling  Not hypoxic  Knee and hip pain    - XR pending  - Trend labs  - Pain control  - IV hydration     Recent Labs     07/10/21  1913 07/11/21  0755 07/12/21  0605 07/13/21  0534   WBC 13 48* 7 16 7 66 6 30   HGB 11 1* 9 8* 9 5* 9 1*   LDH  --  387* 378* 316*   TBILI 0 76  --  1 03* 0 68

## 2021-07-13 NOTE — ASSESSMENT & PLAN NOTE
Lab Results   Component Value Date    HGBA1C 4 7 07/11/2021       Recent Labs     07/12/21  1557 07/12/21  2128 07/13/21  0714 07/13/21  1056   POCGLU 103 86 82 85       Blood Sugar Average: Last 72 hrs:  (P) 945 [id number]    Not on medications  Diabetic diet  Sliding scale

## 2021-07-13 NOTE — PLAN OF CARE
Problem: Potential for Falls  Goal: Patient will remain free of falls  Description: INTERVENTIONS:  - Educate patient/family on patient safety including physical limitations  - Instruct patient to call for assistance with activity   - Consult OT/PT to assist with strengthening/mobility   - Keep Call bell within reach  - Keep bed low and locked with side rails adjusted as appropriate  - Keep care items and personal belongings within reach  - Initiate and maintain comfort rounds  - Make Fall Risk Sign visible to staff  - Offer Toileting every 2 Hours, in advance of need  - Initiate/Maintain   alarm  - Obtain necessary fall risk management equipment:     - Apply yellow socks and bracelet for high fall risk patients  - Consider moving patient to room near nurses station  Outcome: Progressing     Problem: PAIN - ADULT  Goal: Verbalizes/displays adequate comfort level or baseline comfort level  Description: Interventions:  - Encourage patient to monitor pain and request assistance  - Assess pain using appropriate pain scale  - Administer analgesics based on type and severity of pain and evaluate response  - Implement non-pharmacological measures as appropriate and evaluate response  - Consider cultural and social influences on pain and pain management  - Notify physician/advanced practitioner if interventions unsuccessful or patient reports new pain  Outcome: Progressing     Problem: INFECTION - ADULT  Goal: Absence or prevention of progression during hospitalization  Description: INTERVENTIONS:  - Assess and monitor for signs and symptoms of infection  - Monitor lab/diagnostic results  - Monitor all insertion sites, i e  indwelling lines, tubes, and drains  - Monitor endotracheal if appropriate and nasal secretions for changes in amount and color  - [place] appropriate cooling/warming therapies per order  - Administer medications as ordered  - Instruct and encourage patient and family to use good hand hygiene technique  - Identify and instruct in appropriate isolation precautions for identified infection/condition  Outcome: Progressing  Goal: Absence of fever/infection during neutropenic period  Description: INTERVENTIONS:  - Monitor WBC    Outcome: Progressing     Problem: SAFETY ADULT  Goal: Maintain or return to baseline ADL function  Description: INTERVENTIONS:  -  Assess patient's ability to carry out ADLs; assess patient's baseline for ADL function and identify physical deficits which impact ability to perform ADLs (bathing, care of mouth/teeth, toileting, grooming, dressing, etc )  - Assess/evaluate cause of self-care deficits   - Assess range of motion  - Assess patient's mobility; develop plan if impaired  - Assess patient's need for assistive devices and provide as appropriate  - Encourage maximum independence but intervene and supervise when necessary  - Involve family in performance of ADLs  - Assess for home care needs following discharge   - Consider OT consult to assist with ADL evaluation and planning for discharge  - Provide patient education as appropriate  Outcome: Progressing  Goal: Maintains/Returns to pre admission functional level  Description: INTERVENTIONS:  - Perform BMAT or MOVE assessment daily    - Set and communicate daily mobility goal to care team and patient/family/caregiver  - Collaborate with rehabilitation services on mobility goals if consulted  - Perform Range of Motion 3 times a day  - Reposition patient every 2 hours    - Dangle patient 3 times a day  - Stand patient 3 times a day  - Ambulate patient 3 times a day  - Out of bed to chair 3 times a day   - Out of bed for meals 3 times a day  - Out of bed for toileting  - Record patient progress and toleration of activity level   Outcome: Progressing     Problem: DISCHARGE PLANNING  Goal: Discharge to home or other facility with appropriate resources  Description: INTERVENTIONS:  - Identify barriers to discharge w/patient and caregiver  - Arrange for needed discharge resources and transportation as appropriate  - Identify discharge learning needs (meds, wound care, etc )  - Arrange for interpretive services to assist at discharge as needed  - Refer to Case Management Department for coordinating discharge planning if the patient needs post-hospital services based on physician/advanced practitioner order or complex needs related to functional status, cognitive ability, or social support system  Outcome: Progressing     Problem: Knowledge Deficit  Goal: Patient/family/caregiver demonstrates understanding of disease process, treatment plan, medications, and discharge instructions  Description: Complete learning assessment and assess knowledge base    Interventions:  - Provide teaching at level of understanding  - Provide teaching via preferred learning methods  Outcome: Progressing     Problem: HEMATOLOGIC - ADULT  Goal: Maintains hematologic stability  Description: INTERVENTIONS  - Assess for signs and symptoms of bleeding or hemorrhage  - Monitor labs  - Administer supportive blood products/factors as ordered and appropriate  Outcome: Progressing     Problem: METABOLIC, FLUID AND ELECTROLYTES - ADULT  Goal: Electrolytes maintained within normal limits  Description: INTERVENTIONS:  - Monitor labs and assess patient for signs and symptoms of electrolyte imbalances  - Administer electrolyte replacement as ordered  - Monitor response to electrolyte replacements, including repeat lab results as appropriate  - Instruct patient on fluid and nutrition as appropriate  Outcome: Progressing

## 2021-07-13 NOTE — ASSESSMENT & PLAN NOTE
Downtrending  Actively sickling  Continue monitoring, no indication for transfusion at this time      Recent Labs     07/10/21  1913 07/11/21  0755 07/12/21  0605 07/13/21  0534   HGB 11 1* 9 8* 9 5* 9 1*   MCV 67* 68* 68* 68*   RDW 16 6* 16 3* 16 0* 16 0*

## 2021-07-13 NOTE — PROGRESS NOTES
[facility]  Progress Note - Michelle Favors 1980, 35 y o  female MRN: [id number]  Unit/Bed#: [facility] 87 221-01 Encounter: [id number]  Primary Care Provider: No primary care provider on file  Date and time admitted to hospital: 7/10/2021  6:43 PM    * Sickle cell anemia with pain [facility])  Assessment & Plan  35year old female presenting with sickle cell crisis  Still actively sickling  Not hypoxic  Knee and hip pain  - XR pending  - Trend labs  - Pain control  - IV hydration     Recent Labs     07/10/21  1913 07/11/21  0755 07/12/21  0605 07/13/21  0534   WBC 13 48* 7 16 7 66 6 30   HGB 11 1* 9 8* 9 5* 9 1*   LDH  --  387* 378* 316*   TBILI 0 76  --  1 03* 0 68         Sickle cell anemia ([facility] [address] )  Assessment & Plan  Downtrending  Actively sickling  Continue monitoring, no indication for transfusion at this time  Recent Labs     07/10/21  1913 07/11/21  0755 07/12/21  0605 07/13/21  0534   HGB 11 1* 9 8* 9 5* 9 1*   MCV 67* 68* 68* 68*   RDW 16 6* 16 3* 16 0* 16 0*         Diet-controlled diabetes mellitus [facility])  Assessment & Plan  Lab Results   Component Value Date    HGBA1C 4 7 07/11/2021       Recent Labs     07/12/21  1557 07/12/21  2128 07/13/21  0714 07/13/21  1056   POCGLU 103 86 82 85       Blood Sugar Average: Last 72 hrs:  (P) 101 [id number]    Not on medications  Diabetic diet  Sliding scale  VTE Pharmacologic Prophylaxis:   Pharmacologic: Enoxaparin (Lovenox)  Mechanical VTE Prophylaxis in Place: Yes    Patient Centered Rounds: I have performed bedside rounds with nursing staff today  Discussions with Specialists or Other Care Team Provider: Nursing    Education and Discussions with Family / Patient: patient    Time Spent for Care: 30 minutes  More than 50% of total time spent on counseling and coordination of care as described above      Current Length of Stay: 1 day(s)    Current Patient Status: Inpatient   Certification Statement: The patient will continue to require additional inpatient hospital stay due to iv pain, iv fluids, hematology evaluation    Discharge Plan: active    Code Status: Level 1 - Full Code      Subjective:   Patient seen and examined at bedside  Denies shortness of breath, but still complaining of significant pain  Objective:     Vitals:   Temp (24hrs), Av 7 °F (37 1 °C), Min:98 1 °F (36 7 °C), Max:99 2 °F (37 3 °C)    Temp:  [98 1 °F (36 7 °C)-99 2 °F (37 3 °C)] 99 2 °F (37 3 °C)  HR:  [84-94] 84  Resp:  [14-18] 14  BP: ()/(64-73) 98/64  SpO2:  [97 %-99 %] 97 %  There is no height or weight on file to calculate BMI  Input and Output Summary (last 24 hours): Intake/Output Summary (Last 24 hours) at 2021 1248  Last data filed at 2021 1817  Gross per 24 hour   Intake 1000 ml   Output --   Net 1000 ml       Physical Exam:     Physical Exam  Vitals reviewed  Constitutional:       General: She is not in acute distress  Appearance: She is not ill-appearing  HENT:      Head: Normocephalic  Nose: Nose normal       Mouth/Throat:      Mouth: Mucous membranes are moist    Eyes:      General: No scleral icterus  Pupils: Pupils are equal, round, and reactive to light  Cardiovascular:      Rate and Rhythm: Normal rate and regular rhythm  Pulmonary:      Effort: Pulmonary effort is normal  No respiratory distress  Breath sounds: No wheezing or rales  Abdominal:      General: There is no distension  Palpations: Abdomen is soft  Tenderness: There is no abdominal tenderness  Musculoskeletal:      Right lower leg: No edema  Left lower leg: No edema  Skin:     General: Skin is warm  Neurological:      Mental Status: She is alert  Mental status is at baseline     Psychiatric:         Mood and Affect: Mood normal          Behavior: Behavior normal        Additional Data:     Labs:    Results from last 7 days   Lab Units 21  0534 07/10/21  1913   WBC Thousand/uL 6 30 13 48* HEMOGLOBIN g/dL 9 1* 11 1*   HEMATOCRIT % 27 9* 33 7*   PLATELETS Thousands/uL 145* 251   BANDS PCT %  --  4   NEUTROS PCT % 60  --    LYMPHS PCT % 27  --    LYMPHO PCT %  --  9*   MONOS PCT % 10  --    MONO PCT %  --  8   EOS PCT % 2 0     Results from last 7 days   Lab Units 07/13/21  0534   SODIUM mmol/L 139   POTASSIUM mmol/L 3 5   CHLORIDE mmol/L 105   CO2 mmol/L 25   BUN mg/dL 4*   CREATININE mg/dL 0 60   ANION GAP mmol/L 9   CALCIUM mg/dL 8 2*   ALBUMIN g/dL 2 8*   TOTAL BILIRUBIN mg/dL 0 68   ALK PHOS U/L 42*   ALT U/L 21   AST U/L 26   GLUCOSE RANDOM mg/dL 81         Results from last 7 days   Lab Units 07/13/21  1056 07/13/21  0714 07/12/21  2128 07/12/21  1557 07/12/21  1114 07/12/21  0744 07/11/21  2053 07/11/21  1702 07/11/21  1118 07/11/21  0723 07/10/21  2208   POC GLUCOSE mg/dl 85 82 86 103 96 92 102 95 152* 110 109     Results from last 7 days   Lab Units 07/11/21  0755   HEMOGLOBIN A1C % 4 7     Results from last 7 days   Lab Units 07/13/21  0534 07/12/21  0605   PROCALCITONIN ng/ml 0 05 <0 05           * I Have Reviewed All Lab Data Listed Above  * Additional Pertinent Lab Tests Reviewed:  [facility]    Imaging:    Imaging Reports Reviewed Today Include: no new imaging    Recent Cultures (last 7 days):           Last 24 Hours Medication List:   Current Facility-Administered Medications   Medication Dose Route Frequency Provider Last Rate    acetaminophen  650 mg Oral Q6H PRN Liberty Vines MD      enoxaparin  40 mg Subcutaneous Q24H [facility] Nargis Bell MD      insulin lispro  1-5 Units Subcutaneous TID AC Liberty Vines MD      insulin lispro  1-5 Units Subcutaneous HS Liberty Vines MD      morphine injection  2 mg Intravenous Q3H PRN Liberty Vines MD      Or    morphine injection  4 mg Intravenous Q3H PRN Liberty Vines MD      ondansetron  4 mg Intravenous Q6H PRN Liberty Vines MD      sodium chloride  75 mL/hr Intravenous Continuous Nargis Ray, MD 75 mL/hr (07/13/21 8533)        Today, Patient Was Seen By: Juan Ramon Peralta MD    ** Please Note: Dictation voice to text software may have been used in the creation of this document   **

## 2021-07-13 NOTE — CONSULTS
Oncology Consult Note  Pavel Hernandez 35 y o  female MRN: [id number]  Unit/Bed#: [facility] 2 [facility] 87 [id number] Encounter: [id number]      Presenting Complaint:  Sickle cell disease with pain crisis    History of Presenting Illness: The patient is a 47-year-old female with history of sickle cell disease who is moving to the [facility]  Lung moving she started having lower extremity pain which she states is which she experiences well sickle cell crisis so came to the hospital   She was started on hydration and pain medication  Her hemoglobin is still running above 9  Bilirubin was very slightly elevated with a very slight elevation in her reticulocyte count  Haptoglobin was still within normal range  As far symptoms are concerned she is quite comfortable today  She states when she walks she has lower extremity pain and knee pain  Denies any nausea or vomiting  Denies any diarrhea  Breathing is not labored and quite normal   The rest of her 14 point review of systems today was negative      Review of Systems - As stated in the HPI otherwise the fourteen point review of systems was negative      Past Medical History:   Diagnosis Date    Sickle [facility] disease ([facility] [place] [facility] )        Social History     Socioeconomic History    Marital status: Single     Spouse name: None    Number of children: None    Years of education: None    Highest education level: None   Occupational History    None   Tobacco Use    Smoking status: Never Smoker    Smokeless tobacco: Never Used   Substance and Sexual Activity    Alcohol use: Never    Drug use: Yes     Types: Marijuana    Sexual activity: None   Other Topics Concern    None   Social History Narrative    None     Social Determinants of Health     Financial Resource Strain:     Difficulty of Paying Living Expenses:    Food Insecurity:     Worried About Running Out of Food in the Last Year:     Ran Out of Food in the Last Year:    Transportation Needs:     Lack of Transportation (Medical):  Lack of Transportation (Non-Medical):    Physical Activity:     Days of Exercise per Week:     Minutes of Exercise per Session:    Stress:     Feeling of Stress :    Social Connections:     Frequency of Communication with Friends and Family:     Frequency of Social Gatherings with Friends and Family:     Attends Mu-ism Services:     Active Member of Clubs or Organizations:     Attends Club or Organization Meetings:     Marital Status:    Intimate Partner Violence:     Fear of Current or Ex-Partner:     Emotionally Abused:     Physically Abused:     Sexually Abused:        History reviewed  No pertinent family history      No Known Allergies      Current Facility-Administered Medications:     acetaminophen (TYLENOL) tablet 650 mg, 650 mg, Oral, Q6H PRN, Gary Barnes MD, 650 mg at 07/11/21 2203    enoxaparin (LOVENOX) subcutaneous injection 40 mg, 40 mg, Subcutaneous, Q24H [facility], Bindu Flores MD, 40 mg at 07/13/21 0838    insulin lispro (HumaLOG) 100 units/mL subcutaneous injection 1-5 Units, 1-5 Units, Subcutaneous, TID AC, 1 Units at 07/11/21 1202 **AND** Fingerstick Glucose (POCT), , , TID AC, Litzy Vernon MD    insulin lispro (HumaLOG) 100 units/mL subcutaneous injection 1-5 Units, 1-5 Units, Subcutaneous, HS, Gary Barnes MD    morphine injection 2 mg, 2 mg, Intravenous, Q3H PRN **OR** morphine (PF) 4 mg/mL injection 4 mg, 4 mg, Intravenous, Q3H PRN, Gary Barnes MD, 4 mg at 07/13/21 1309    ondansetron (ZOFRAN) injection 4 mg, 4 mg, Intravenous, Q6H PRN, Gary Barnes MD    sodium chloride infusion 0 45 %, 75 mL/hr, Intravenous, Continuous, Bindu Flores MD, Last Rate: 75 mL/hr at 07/13/21 0623, 75 mL/hr at 07/13/21 0623      /76   Pulse 82   Temp 98 6 °F (37 °C)   Resp 14   Wt 84 9 kg (187 lb 2 7 oz)   SpO2 99%     General Appearance:    Alert, oriented        Eyes:    PERRL   Ears:    Normal external ear canals, both ears   Nose:   Nares normal, septum midline   Throat:   Mucosa moist  Pharynx without injection  Neck:   Supple       Lungs:     Clear to auscultation bilaterally   Chest Wall:    No tenderness or deformity    Heart:    Regular rate and rhythm       Abdomen:     Soft, non-tender, bowel sounds +, no organomegaly           Extremities:   Extremities no cyanosis or edema       Skin:   no rash or icterus      Lymph nodes:   Cervical, supraclavicular, and axillary nodes normal   Neurologic:   CNII-XII intact, normal strength, sensation and reflexes     Throughout               Recent Results (from the past 48 hour(s))   Fingerstick Glucose (POCT)    Collection Time: 07/11/21  5:02 PM   Result Value Ref Range    POC Glucose 95 65 - 140 mg/dl   Fingerstick Glucose (POCT)    Collection Time: 07/11/21  8:53 PM   Result Value Ref Range    POC Glucose 102 65 - 140 mg/dl   Lactate dehydrogenase    Collection Time: 07/12/21  6:05 AM   Result Value Ref Range     (H) 81 - 234 U/L   Haptoglobin    Collection Time: 07/12/21  6:05 AM   Result Value Ref Range    Haptoglobin 82 42 - 296 mg/dL   Comprehensive metabolic panel    Collection Time: 07/12/21  6:05 AM   Result Value Ref Range    Sodium 139 136 - 145 mmol/L    Potassium 3 3 (L) 3 5 - 5 3 mmol/L    Chloride 104 100 - 108 mmol/L    CO2 24 21 - 32 mmol/L    ANION GAP 11 4 - 13 mmol/L    BUN 4 (L) 5 - 25 mg/dL    Creatinine 0 64 0 60 - 1 30 mg/dL    Glucose 99 65 - 140 mg/dL    Glucose, Fasting 99 65 - 99 mg/dL    Calcium 8 4 8 3 - 10 1 mg/dL    Corrected Calcium 9 1 8 3 - 10 1 mg/dL    AST 29 5 - 45 U/L    ALT 26 12 - 78 U/L    Alkaline Phosphatase 44 (L) 46 - 116 U/L    Total Protein 7 0 6 4 - 8 2 g/dL    Albumin 3 1 (L) 3 5 - 5 0 g/dL    Total Bilirubin 1 03 (H) 0 20 - 1 00 mg/dL    eGFR 126 ml/min/1 73sq m   Magnesium    Collection Time: 07/12/21  6:05 AM   Result Value Ref Range    Magnesium 2 0 1 6 - 2 6 mg/dL   CBC and differential    Collection Time: 07/12/21  6:05 AM   Result Value Ref Range    WBC 7  66 4 31 - 10 16 Thousand/uL    RBC 4 34 3 81 - 5 12 Million/uL    Hemoglobin 9 5 (L) 11 5 - 15 4 g/dL    Hematocrit 29 3 (L) 34 8 - 46 1 %    MCV 68 (L) 82 - 98 fL    MCH 21 9 (L) 26 8 - 34 3 pg    MCHC 32 4 31 4 - 37 4 g/dL    RDW 16 0 (H) 11 6 - 15 1 %    MPV 9 9 8 9 - 12 7 fL    Platelets 991 583 - 945 Thousands/uL    nRBC 0 /100 WBCs    Neutrophils Relative 65 43 - 75 %    Immat GRANS % 1 0 - 2 %    Lymphocytes Relative 21 14 - 44 %    Monocytes Relative 12 4 - 12 %    Eosinophils Relative 1 0 - 6 %    Basophils Relative 0 0 - 1 %    Neutrophils Absolute 5 00 1 85 - 7 62 Thousands/µL    Immature Grans Absolute 0 07 0 00 - 0 20 Thousand/uL    Lymphocytes Absolute 1 63 0 60 - 4 47 Thousands/µL    Monocytes Absolute 0 88 0 17 - 1 22 Thousand/µL    Eosinophils Absolute 0 06 0 00 - 0 61 Thousand/µL    Basophils Absolute 0 02 0 00 - 0 10 Thousands/µL   Procalcitonin    Collection Time: 07/12/21  6:05 AM   Result Value Ref Range    Procalcitonin <0 05 <=0 25 ng/ml   Fingerstick Glucose (POCT)    Collection Time: 07/12/21  7:44 AM   Result Value Ref Range    POC Glucose 92 65 - 140 mg/dl   Fingerstick Glucose (POCT)    Collection Time: 07/12/21 11:14 AM   Result Value Ref Range    POC Glucose 96 65 - 140 mg/dl   Fingerstick Glucose (POCT)    Collection Time: 07/12/21  3:57 PM   Result Value Ref Range    POC Glucose 103 65 - 140 mg/dl   Fingerstick Glucose (POCT)    Collection Time: 07/12/21  9:28 PM   Result Value Ref Range    POC Glucose 86 65 - 140 mg/dl   Procalcitonin Reflex    Collection Time: 07/13/21  5:34 AM   Result Value Ref Range    Procalcitonin 0 05 <=0 25 ng/ml   CBC and differential    Collection Time: 07/13/21  5:34 AM   Result Value Ref Range    WBC 6 30 4 31 - 10 16 Thousand/uL    RBC 4 13 3 81 - 5 12 Million/uL    Hemoglobin 9 1 (L) 11 5 - 15 4 g/dL    Hematocrit 27 9 (L) 34 8 - 46 1 %    MCV 68 (L) 82 - 98 fL    MCH 22 0 (L) 26 8 - 34 3 pg    MCHC 32 6 31 4 - 37 4 g/dL    RDW 16 0 (H) 11 6 - 15 1 % MPV 10 7 8 9 - 12 7 fL    Platelets 048 (L) 983 - 390 Thousands/uL    nRBC 0 /100 WBCs    Neutrophils Relative 60 43 - 75 %    Immat GRANS % 1 0 - 2 %    Lymphocytes Relative 27 14 - 44 %    Monocytes Relative 10 4 - 12 %    Eosinophils Relative 2 0 - 6 %    Basophils Relative 0 0 - 1 %    Neutrophils Absolute 3 79 1 85 - 7 62 Thousands/µL    Immature Grans Absolute 0 04 0 00 - 0 20 Thousand/uL    Lymphocytes Absolute 1 72 0 60 - 4 47 Thousands/µL    Monocytes Absolute 0 63 0 17 - 1 22 Thousand/µL    Eosinophils Absolute 0 10 0 00 - 0 61 Thousand/µL    Basophils Absolute 0 02 0 00 - 0 10 Thousands/µL   Comprehensive metabolic panel    Collection Time: 07/13/21  5:34 AM   Result Value Ref Range    Sodium 139 136 - 145 mmol/L    Potassium 3 5 3 5 - 5 3 mmol/L    Chloride 105 100 - 108 mmol/L    CO2 25 21 - 32 mmol/L    ANION GAP 9 4 - 13 mmol/L    BUN 4 (L) 5 - 25 mg/dL    Creatinine 0 60 0 60 - 1 30 mg/dL    Glucose 81 65 - 140 mg/dL    Calcium 8 2 (L) 8 3 - 10 1 mg/dL    Corrected Calcium 9 2 8 3 - 10 1 mg/dL    AST 26 5 - 45 U/L    ALT 21 12 - 78 U/L    Alkaline Phosphatase 42 (L) 46 - 116 U/L    Total Protein 6 8 6 4 - 8 2 g/dL    Albumin 2 8 (L) 3 5 - 5 0 g/dL    Total Bilirubin 0 68 0 20 - 1 00 mg/dL    eGFR 129 ml/min/1 73sq m   Magnesium    Collection Time: 07/13/21  5:34 AM   Result Value Ref Range    Magnesium 2 0 1 6 - 2 6 mg/dL   Bilirubin, direct    Collection Time: 07/13/21  5:34 AM   Result Value Ref Range    Bilirubin, Direct 0 24 (H) 0 00 - 0 20 mg/dL   Lactate dehydrogenase    Collection Time: 07/13/21  5:34 AM   Result Value Ref Range     (H) 81 - 234 U/L   Fingerstick Glucose (POCT)    Collection Time: 07/13/21  7:14 AM   Result Value Ref Range    POC Glucose 82 65 - 140 mg/dl   Fingerstick Glucose (POCT)    Collection Time: 07/13/21 10:56 AM   Result Value Ref Range    POC Glucose 85 65 - 140 mg/dl         XR chest 2 views    Result Date: 7/11/2021  Narrative: CHEST INDICATION:   CP  COMPARISON:  None EXAM PERFORMED/VIEWS:  XR CHEST PA & LATERAL FINDINGS: Cardiomediastinal silhouette appears unremarkable  The lungs are clear  No pneumothorax or pleural effusion  Osseous structures appear within normal limits for patient age  Impression: No acute cardiopulmonary disease  Workstation performed: [id number]     XR knee 1 or 2 vw left    Result Date: 7/13/2021  Narrative: LEFT KNEE INDICATION:   knee pain, sickle cell crisis?   COMPARISON:  None VIEWS:  XR KNEE 1 OR 2 VW LEFT FINDINGS: There is no acute fracture or dislocation  There is no joint effusion  No significant degenerative changes  No lytic or blastic osseous lesion  Soft tissues are unremarkable  Impression: No acute osseous abnormality  Workstation performed: [id number]     XR knee 1 or 2 vw right    Result Date: 7/13/2021  Narrative: RIGHT KNEE INDICATION:   knee pain, sickle cell crisis?   COMPARISON:  None VIEWS:  XR KNEE 1 OR 2 VW RIGHT FINDINGS: There is no acute fracture or dislocation  There is no joint effusion  No significant degenerative changes  No lytic or blastic osseous lesion  Soft tissues are unremarkable  Impression: No acute osseous abnormality  Workstation performed: [id number]     XR pelvis ap only 1 or 2 vw    Result Date: 7/13/2021  Narrative: PELVIS INDICATION:   hip pain  COMPARISON:  None VIEWS:  XR PELVIS AP ONLY 1 OR 2 VW FINDINGS: No acute fracture or hip dislocation  No significant degenerative changes  No lytic or blastic osseous lesion  Soft tissues are unremarkable  The visualized lumbar spine is unremarkable  Impression: No acute osseous abnormality  Workstation performed: [id number]       Assessment and Plan: This is a 57-year-old female with history of sickle cell anemia who was admitted to the hospital with sickle cell crisis  It may have been triggered by dehydration and her moving heavy objects    Regardless she states she did used to follow with a hematologist in [place] so she can establish care with 1 of my partners in the [facility] office as she lives in [facility]  She does not remember ever taking Hydrea or any pill for sickle cell disease  At this point I agree with hydration and pain control  Once she is comfortable she can be discharged  Reticulocytes were slightly elevated  Hemoglobin is above 9 so no need for transfusion at this point  Discussed with our colleagues in Internal Medicine who agree  Please call with any questions

## 2021-07-13 NOTE — PLAN OF CARE
Problem: Potential for Falls  Goal: Patient will remain free of falls  Description: INTERVENTIONS:  - Educate patient/family on patient safety including physical limitations  - Instruct patient to call for assistance with activity   - Consult OT/PT to assist with strengthening/mobility   - Keep Call bell within reach  - Keep bed low and locked with side rails adjusted as appropriate  - Keep care items and personal belongings within reach  - Initiate and maintain comfort rounds  - Make Fall Risk Sign visible to staff  - Offer Toileting every 2 Hours, in advance of need  - Initiate/Maintain bed alarm  - Obtain necessary fall risk management equipment: walker   - Apply yellow socks and bracelet for high fall risk patients  - Consider moving patient to room near nurses station  Outcome: Progressing     Problem: PAIN - ADULT  Goal: Verbalizes/displays adequate comfort level or baseline comfort level  Description: Interventions:  - Encourage patient to monitor pain and request assistance  - Assess pain using appropriate pain scale  - Administer analgesics based on type and severity of pain and evaluate response  - Implement non-pharmacological measures as appropriate and evaluate response  - Consider cultural and social influences on pain and pain management  - Notify physician/advanced practitioner if interventions unsuccessful or patient reports new pain  Outcome: Progressing     Problem: INFECTION - ADULT  Goal: Absence or prevention of progression during hospitalization  Description: INTERVENTIONS:  - Assess and monitor for signs and symptoms of infection  - Monitor lab/diagnostic results  - Monitor all insertion sites, i e  indwelling lines, tubes, and drains  - Monitor endotracheal if appropriate and nasal secretions for changes in amount and color  - [place] appropriate cooling/warming therapies per order  - Administer medications as ordered  - Instruct and encourage patient and family to use good hand hygiene technique  - Identify and instruct in appropriate isolation precautions for identified infection/condition  Outcome: Progressing  Goal: Absence of fever/infection during neutropenic period  Description: INTERVENTIONS:  - Monitor WBC    Outcome: Progressing     Problem: SAFETY ADULT  Goal: Maintain or return to baseline ADL function  Description: INTERVENTIONS:  -  Assess patient's ability to carry out ADLs; assess patient's baseline for ADL function and identify physical deficits which impact ability to perform ADLs (bathing, care of mouth/teeth, toileting, grooming, dressing, etc )  - Assess/evaluate cause of self-care deficits   - Assess range of motion  - Assess patient's mobility; develop plan if impaired  - Assess patient's need for assistive devices and provide as appropriate  - Encourage maximum independence but intervene and supervise when necessary  - Involve family in performance of ADLs  - Assess for home care needs following discharge   - Consider OT consult to assist with ADL evaluation and planning for discharge  - Provide patient education as appropriate  Outcome: Progressing  Goal: Maintains/Returns to pre admission functional level  Description: INTERVENTIONS:  - Perform BMAT or MOVE assessment daily    - Set and communicate daily mobility goal to care team and patient/family/caregiver  - Collaborate with rehabilitation services on mobility goals if consulted  - Perform Range of Motion 3 times a day  - Reposition patient every 3 hours    - Dangle patient 3 times a day  - Stand patient 3 times a day  - Ambulate patient 3 times a day  - Out of bed to chair 3 times a day   - Out of bed for meals 3 times a day  - Out of bed for toileting  - Record patient progress and toleration of activity level   Outcome: Progressing     Problem: DISCHARGE PLANNING  Goal: Discharge to home or other facility with appropriate resources  Description: INTERVENTIONS:  - Identify barriers to discharge w/patient and caregiver  - Arrange for needed discharge resources and transportation as appropriate  - Identify discharge learning needs (meds, wound care, etc )  - Arrange for interpretive services to assist at discharge as needed  - Refer to Case Management Department for coordinating discharge planning if the patient needs post-hospital services based on physician/advanced practitioner order or complex needs related to functional status, cognitive ability, or social support system  Outcome: Progressing     Problem: Knowledge Deficit  Goal: Patient/family/caregiver demonstrates understanding of disease process, treatment plan, medications, and discharge instructions  Description: Complete learning assessment and assess knowledge base    Interventions:  - Provide teaching at level of understanding  - Provide teaching via preferred learning methods  Outcome: Progressing     Problem: HEMATOLOGIC - ADULT  Goal: Maintains hematologic stability  Description: INTERVENTIONS  - Assess for signs and symptoms of bleeding or hemorrhage  - Monitor labs  - Administer supportive blood products/factors as ordered and appropriate  Outcome: Progressing     Problem: METABOLIC, FLUID AND ELECTROLYTES - ADULT  Goal: Electrolytes maintained within normal limits  Description: INTERVENTIONS:  - Monitor labs and assess patient for signs and symptoms of electrolyte imbalances  - Administer electrolyte replacement as ordered  - Monitor response to electrolyte replacements, including repeat lab results as appropriate  - Instruct patient on fluid and nutrition as appropriate  Outcome: Progressing

## 2021-07-14 ENCOUNTER — APPOINTMENT (INPATIENT)
Dept: RADIOLOGY | Facility: HOSPITAL | Age: 41
DRG: 812 | End: 2021-07-14
Payer: COMMERCIAL

## 2021-07-14 LAB
ALBUMIN SERPL BCP-MCNC: 2.9 G/DL (ref 3.5–5)
ALP SERPL-CCNC: 38 U/L (ref 46–116)
ALT SERPL W P-5'-P-CCNC: 17 U/L (ref 12–78)
ANION GAP SERPL CALCULATED.3IONS-SCNC: 6 MMOL/L (ref 4–13)
AST SERPL W P-5'-P-CCNC: 19 U/L (ref 5–45)
ATRIAL RATE: 67 BPM
BASOPHILS # BLD AUTO: 0.02 THOUSANDS/ΜL (ref 0–0.1)
BASOPHILS NFR BLD AUTO: 1 % (ref 0–1)
BILIRUB SERPL-MCNC: 0.67 MG/DL (ref 0.2–1)
BUN SERPL-MCNC: 4 MG/DL (ref 5–25)
CALCIUM ALBUM COR SERPL-MCNC: 9.3 MG/DL (ref 8.3–10.1)
CALCIUM SERPL-MCNC: 8.4 MG/DL (ref 8.3–10.1)
CHLORIDE SERPL-SCNC: 104 MMOL/L (ref 100–108)
CO2 SERPL-SCNC: 28 MMOL/L (ref 21–32)
CREAT SERPL-MCNC: 0.67 MG/DL (ref 0.6–1.3)
EOSINOPHIL # BLD AUTO: 0.08 THOUSAND/ΜL (ref 0–0.61)
EOSINOPHIL NFR BLD AUTO: 2 % (ref 0–6)
ERYTHROCYTE [DISTWIDTH] IN BLOOD BY AUTOMATED COUNT: 15.6 % (ref 11.6–15.1)
GFR SERPL CREATININE-BSD FRML MDRD: 125 ML/MIN/1.73SQ M
GLUCOSE SERPL-MCNC: 101 MG/DL (ref 65–140)
GLUCOSE SERPL-MCNC: 85 MG/DL (ref 65–140)
GLUCOSE SERPL-MCNC: 91 MG/DL (ref 65–140)
GLUCOSE SERPL-MCNC: 93 MG/DL (ref 65–140)
GLUCOSE SERPL-MCNC: 97 MG/DL (ref 65–140)
HCT VFR BLD AUTO: 29.9 % (ref 34.8–46.1)
HGB BLD-MCNC: 9.8 G/DL (ref 11.5–15.4)
IMM GRANULOCYTES # BLD AUTO: 0.02 THOUSAND/UL (ref 0–0.2)
IMM GRANULOCYTES NFR BLD AUTO: 1 % (ref 0–2)
LDH SERPL-CCNC: 313 U/L (ref 81–234)
LYMPHOCYTES # BLD AUTO: 1.69 THOUSANDS/ΜL (ref 0.6–4.47)
LYMPHOCYTES NFR BLD AUTO: 39 % (ref 14–44)
MAGNESIUM SERPL-MCNC: 2.1 MG/DL (ref 1.6–2.6)
MCH RBC QN AUTO: 22.1 PG (ref 26.8–34.3)
MCHC RBC AUTO-ENTMCNC: 32.8 G/DL (ref 31.4–37.4)
MCV RBC AUTO: 68 FL (ref 82–98)
MONOCYTES # BLD AUTO: 0.47 THOUSAND/ΜL (ref 0.17–1.22)
MONOCYTES NFR BLD AUTO: 11 % (ref 4–12)
NEUTROPHILS # BLD AUTO: 2.05 THOUSANDS/ΜL (ref 1.85–7.62)
NEUTS SEG NFR BLD AUTO: 46 % (ref 43–75)
NRBC BLD AUTO-RTO: 0 /100 WBCS
P AXIS: 53 DEGREES
PLATELET # BLD AUTO: 198 THOUSANDS/UL (ref 149–390)
PMV BLD AUTO: 9.9 FL (ref 8.9–12.7)
POTASSIUM SERPL-SCNC: 3.6 MMOL/L (ref 3.5–5.3)
PR INTERVAL: 150 MS
PROT SERPL-MCNC: 7.3 G/DL (ref 6.4–8.2)
QRS AXIS: 50 DEGREES
QRSD INTERVAL: 70 MS
QT INTERVAL: 390 MS
QTC INTERVAL: 412 MS
RBC # BLD AUTO: 4.43 MILLION/UL (ref 3.81–5.12)
SODIUM SERPL-SCNC: 138 MMOL/L (ref 136–145)
T WAVE AXIS: 30 DEGREES
VENTRICULAR RATE: 67 BPM
WBC # BLD AUTO: 4.33 THOUSAND/UL (ref 4.31–10.16)

## 2021-07-14 PROCEDURE — 85660 RBC SICKLE CELL TEST: CPT | Performed by: INTERNAL MEDICINE

## 2021-07-14 PROCEDURE — 82948 REAGENT STRIP/BLOOD GLUCOSE: CPT

## 2021-07-14 PROCEDURE — 83735 ASSAY OF MAGNESIUM: CPT | Performed by: STUDENT IN AN ORGANIZED HEALTH CARE EDUCATION/TRAINING PROGRAM

## 2021-07-14 PROCEDURE — 93005 ELECTROCARDIOGRAM TRACING: CPT

## 2021-07-14 PROCEDURE — 83615 LACTATE (LD) (LDH) ENZYME: CPT | Performed by: STUDENT IN AN ORGANIZED HEALTH CARE EDUCATION/TRAINING PROGRAM

## 2021-07-14 PROCEDURE — 99232 SBSQ HOSP IP/OBS MODERATE 35: CPT | Performed by: STUDENT IN AN ORGANIZED HEALTH CARE EDUCATION/TRAINING PROGRAM

## 2021-07-14 PROCEDURE — 85025 COMPLETE CBC W/AUTO DIFF WBC: CPT | Performed by: STUDENT IN AN ORGANIZED HEALTH CARE EDUCATION/TRAINING PROGRAM

## 2021-07-14 PROCEDURE — 71045 X-RAY EXAM CHEST 1 VIEW: CPT

## 2021-07-14 PROCEDURE — 93010 ELECTROCARDIOGRAM REPORT: CPT | Performed by: INTERNAL MEDICINE

## 2021-07-14 PROCEDURE — 80053 COMPREHEN METABOLIC PANEL: CPT | Performed by: STUDENT IN AN ORGANIZED HEALTH CARE EDUCATION/TRAINING PROGRAM

## 2021-07-14 RX ORDER — SENNOSIDES 8.6 MG
1 TABLET ORAL 2 TIMES DAILY
Status: DISCONTINUED | OUTPATIENT
Start: 2021-07-14 | End: 2021-07-20 | Stop reason: HOSPADM

## 2021-07-14 RX ORDER — POLYETHYLENE GLYCOL 3350 17 G/17G
17 POWDER, FOR SOLUTION ORAL DAILY
Status: DISCONTINUED | OUTPATIENT
Start: 2021-07-14 | End: 2021-07-20 | Stop reason: HOSPADM

## 2021-07-14 RX ADMIN — MORPHINE SULFATE 4 MG: 4 INJECTION INTRAVENOUS at 21:27

## 2021-07-14 RX ADMIN — MORPHINE SULFATE 2 MG: 2 INJECTION, SOLUTION INTRAMUSCULAR; INTRAVENOUS at 05:09

## 2021-07-14 RX ADMIN — STANDARDIZED SENNA CONCENTRATE 8.6 MG: 8.6 TABLET ORAL at 17:17

## 2021-07-14 RX ADMIN — POLYETHYLENE GLYCOL 3350 17 G: 17 POWDER, FOR SOLUTION ORAL at 13:59

## 2021-07-14 RX ADMIN — SODIUM CHLORIDE 75 ML/HR: 0.45 INJECTION, SOLUTION INTRAVENOUS at 09:36

## 2021-07-14 RX ADMIN — ENOXAPARIN SODIUM 40 MG: 40 INJECTION SUBCUTANEOUS at 08:17

## 2021-07-14 RX ADMIN — MORPHINE SULFATE 4 MG: 4 INJECTION INTRAVENOUS at 02:22

## 2021-07-14 RX ADMIN — MORPHINE SULFATE 4 MG: 4 INJECTION INTRAVENOUS at 08:21

## 2021-07-14 RX ADMIN — MORPHINE SULFATE 4 MG: 4 INJECTION INTRAVENOUS at 16:43

## 2021-07-14 RX ADMIN — MORPHINE SULFATE 4 MG: 4 INJECTION INTRAVENOUS at 13:08

## 2021-07-14 RX ADMIN — SODIUM CHLORIDE 75 ML/HR: 0.45 INJECTION, SOLUTION INTRAVENOUS at 21:28

## 2021-07-14 NOTE — ASSESSMENT & PLAN NOTE
Stable  Continue monitoring, no indication for transfusion at this time      Recent Labs     07/12/21  0605 07/13/21  0534 07/14/21  0538   HGB 9 5* 9 1* 9 8*   MCV 68* 68* 68*   RDW 16 0* 16 0* 15 6*

## 2021-07-14 NOTE — PLAN OF CARE
Problem: Potential for Falls  Goal: Patient will remain free of falls  Description: INTERVENTIONS:  - Educate patient/family on patient safety including physical limitations  - Instruct patient to call for assistance with activity   - Consult OT/PT to assist with strengthening/mobility   - Keep Call bell within reach  - Keep bed low and locked with side rails adjusted as appropriate  - Keep care items and personal belongings within reach  - Initiate and maintain comfort rounds  - Make Fall Risk Sign visible to staff  - Offer Toileting every 2 Hours, in advance of need  - Initiate/Maintain   alarm  - Obtain necessary fall risk management equipment:     - Apply yellow socks and bracelet for high fall risk patients  - Consider moving patient to room near nurses station  Outcome: Progressing     Problem: PAIN - ADULT  Goal: Verbalizes/displays adequate comfort level or baseline comfort level  Description: Interventions:  - Encourage patient to monitor pain and request assistance  - Assess pain using appropriate pain scale  - Administer analgesics based on type and severity of pain and evaluate response  - Implement non-pharmacological measures as appropriate and evaluate response  - Consider cultural and social influences on pain and pain management  - Notify physician/advanced practitioner if interventions unsuccessful or patient reports new pain  Outcome: Progressing     Problem: INFECTION - ADULT  Goal: Absence or prevention of progression during hospitalization  Description: INTERVENTIONS:  - Assess and monitor for signs and symptoms of infection  - Monitor lab/diagnostic results  - Monitor all insertion sites, i e  indwelling lines, tubes, and drains  - Monitor endotracheal if appropriate and nasal secretions for changes in amount and color  - [place] appropriate cooling/warming therapies per order  - Administer medications as ordered  - Instruct and encourage patient and family to use good hand hygiene technique  - Identify and instruct in appropriate isolation precautions for identified infection/condition  Outcome: Progressing  Goal: Absence of fever/infection during neutropenic period  Description: INTERVENTIONS:  - Monitor WBC    Outcome: Progressing     Problem: SAFETY ADULT  Goal: Maintain or return to baseline ADL function  Description: INTERVENTIONS:  -  Assess patient's ability to carry out ADLs; assess patient's baseline for ADL function and identify physical deficits which impact ability to perform ADLs (bathing, care of mouth/teeth, toileting, grooming, dressing, etc )  - Assess/evaluate cause of self-care deficits   - Assess range of motion  - Assess patient's mobility; develop plan if impaired  - Assess patient's need for assistive devices and provide as appropriate  - Encourage maximum independence but intervene and supervise when necessary  - Involve family in performance of ADLs  - Assess for home care needs following discharge   - Consider OT consult to assist with ADL evaluation and planning for discharge  - Provide patient education as appropriate  Outcome: Progressing  Goal: Maintains/Returns to pre admission functional level  Description: INTERVENTIONS:  - Perform BMAT or MOVE assessment daily    - Set and communicate daily mobility goal to care team and patient/family/caregiver  - Collaborate with rehabilitation services on mobility goals if consulted  - Perform Range of Motion 3 times a day  - Reposition patient every 2 hours    - Dangle patient 3 times a day  - Stand patient 3 times a day  - Ambulate patient 3 times a day  - Out of bed to chair 3 times a day   - Out of bed for meals 3 times a day  - Out of bed for toileting  - Record patient progress and toleration of activity level   Outcome: Progressing

## 2021-07-14 NOTE — PLAN OF CARE
Problem: Potential for Falls  Goal: Patient will remain free of falls  Description: INTERVENTIONS:  - Educate patient/family on patient safety including physical limitations  - Instruct patient to call for assistance with activity   - Consult OT/PT to assist with strengthening/mobility   - Keep Call bell within reach  - Keep bed low and locked with side rails adjusted as appropriate  - Keep care items and personal belongings within reach  - Initiate and maintain comfort rounds  - Make Fall Risk Sign visible to staff  - Apply yellow socks and bracelet for high fall risk patients  - Consider moving patient to room near nurses station  Outcome: Progressing     Problem: PAIN - ADULT  Goal: Verbalizes/displays adequate comfort level or baseline comfort level  Description: Interventions:  - Encourage patient to monitor pain and request assistance  - Assess pain using appropriate pain scale  - Administer analgesics based on type and severity of pain and evaluate response  - Implement non-pharmacological measures as appropriate and evaluate response  - Consider cultural and social influences on pain and pain management  - Notify physician/advanced practitioner if interventions unsuccessful or patient reports new pain  Outcome: Progressing     Problem: INFECTION - ADULT  Goal: Absence or prevention of progression during hospitalization  Description: INTERVENTIONS:  - Assess and monitor for signs and symptoms of infection  - Monitor lab/diagnostic results  - Monitor all insertion sites, i e  indwelling lines, tubes, and drains  - Monitor endotracheal if appropriate and nasal secretions for changes in amount and color  - [place] appropriate cooling/warming therapies per order  - Administer medications as ordered  - Instruct and encourage patient and family to use good hand hygiene technique  - Identify and instruct in appropriate isolation precautions for identified infection/condition  Outcome: Progressing  Goal: Absence of fever/infection during neutropenic period  Description: INTERVENTIONS:  - Monitor WBC    Outcome: Progressing     Problem: SAFETY ADULT  Goal: Maintain or return to baseline ADL function  Description: INTERVENTIONS:  -  Assess patient's ability to carry out ADLs; assess patient's baseline for ADL function and identify physical deficits which impact ability to perform ADLs (bathing, care of mouth/teeth, toileting, grooming, dressing, etc )  - Assess/evaluate cause of self-care deficits   - Assess range of motion  - Assess patient's mobility; develop plan if impaired  - Assess patient's need for assistive devices and provide as appropriate  - Encourage maximum independence but intervene and supervise when necessary  - Involve family in performance of ADLs  - Assess for home care needs following discharge   - Consider OT consult to assist with ADL evaluation and planning for discharge  - Provide patient education as appropriate  Outcome: Progressing  Goal: Maintains/Returns to pre admission functional level  Description: INTERVENTIONS:  - Perform BMAT or MOVE assessment daily    - Set and communicate daily mobility goal to care team and patient/family/caregiver  - Collaborate with rehabilitation services on mobility goals if consulted  - Perform Range of Motion 3 times a day  - Reposition patient every 2 hours    - Dangle patient 3 times a day  - Stand patient 3 times a day  - Ambulate patient 3 times a day  - Out of bed to chair 3 times a day   - Out of bed for meals 3 times a day  - Out of bed for toileting  - Record patient progress and toleration of activity level   Outcome: Progressing     Problem: DISCHARGE PLANNING  Goal: Discharge to home or other facility with appropriate resources  Description: INTERVENTIONS:  - Identify barriers to discharge w/patient and caregiver  - Arrange for needed discharge resources and transportation as appropriate  - Identify discharge learning needs (meds, wound care, etc )  - Arrange for interpretive services to assist at discharge as needed  - Refer to Case Management Department for coordinating discharge planning if the patient needs post-hospital services based on physician/advanced practitioner order or complex needs related to functional status, cognitive ability, or social support system  Outcome: Progressing     Problem: Knowledge Deficit  Goal: Patient/family/caregiver demonstrates understanding of disease process, treatment plan, medications, and discharge instructions  Description: Complete learning assessment and assess knowledge base    Interventions:  - Provide teaching at level of understanding  - Provide teaching via preferred learning methods  Outcome: Progressing     Problem: HEMATOLOGIC - ADULT  Goal: Maintains hematologic stability  Description: INTERVENTIONS  - Assess for signs and symptoms of bleeding or hemorrhage  - Monitor labs  - Administer supportive blood products/factors as ordered and appropriate  Outcome: Progressing     Problem: METABOLIC, FLUID AND ELECTROLYTES - ADULT  Goal: Electrolytes maintained within normal limits  Description: INTERVENTIONS:  - Monitor labs and assess patient for signs and symptoms of electrolyte imbalances  - Administer electrolyte replacement as ordered  - Monitor response to electrolyte replacements, including repeat lab results as appropriate  - Instruct patient on fluid and nutrition as appropriate  Outcome: Progressing

## 2021-07-14 NOTE — ASSESSMENT & PLAN NOTE
35year old female presenting with sickle cell crisis  Pain worse today than yesterday  - Repeat XR chest due to pain  EKG without acute ischemic changes  Tenderness to palpation  - Trend labs  - Pain control  Will optimize today  Bowel regimen ordered    - IV hydration     Recent Labs     07/12/21  0605 07/13/21  0534 07/14/21  0538   WBC 7 66 6 30 4 33   HGB 9 5* 9 1* 9 8*   * 316* 313*   TBILI 1 03* 0 68 0 67

## 2021-07-14 NOTE — PROGRESS NOTES
[address] [place] [address]  Progress Note - Monda Mail 1980, 35 y o  female MRN: [id number]  Unit/Bed#: [facility] 68 2 [facility] 87 [id number] Encounter: [id number]  Primary Care Provider: No primary care provider on file  Date and time admitted to hospital: 7/10/2021  6:43 PM    * Sickle cell anemia with pain [facility])  Assessment & Plan  35year old female presenting with sickle cell crisis  Pain worse today than yesterday  - Repeat XR chest due to pain  EKG without acute ischemic changes  Tenderness to palpation  - Trend labs  - Pain control  Will optimize today  Bowel regimen ordered  - IV hydration     Recent Labs     07/12/21  0605 07/13/21  0534 07/14/21  0538   WBC 7 66 6 30 4 33   HGB 9 5* 9 1* 9 8*   * 316* 313*   TBILI 1 03* 0 68 0 67         Sickle cell anemia (HCC)  Assessment & Plan  Stable  Continue monitoring, no indication for transfusion at this time  Recent Labs     07/12/21  0605 07/13/21  0534 07/14/21  0538   HGB 9 5* 9 1* 9 8*   MCV 68* 68* 68*   RDW 16 0* 16 0* 15 6*         Diet-controlled diabetes mellitus [facility])  Assessment & Plan  Lab Results   Component Value Date    HGBA1C 4 7 07/11/2021       Recent Labs     07/13/21  1631 07/13/21  2104 07/14/21  0719 07/14/21  1047   POCGLU 90 93 91 93       Blood Sugar Average: Last 72 hrs:  (P) 97 [id number]    Not on medications  Diabetic diet  Sliding scale  VTE Pharmacologic Prophylaxis:   Pharmacologic: Enoxaparin (Lovenox)  Mechanical VTE Prophylaxis in Place: Yes    Patient Centered Rounds: I have performed bedside rounds with nursing staff today  Discussions with Specialists or Other Care Team Provider: nursing    Education and Discussions with Family / Patient: patient    Time Spent for Care: 30 minutes  More than 50% of total time spent on counseling and coordination of care as described above      Current Length of Stay: 2 day(s)    Current Patient Status: Inpatient   Certification Statement: The patient will continue to require additional inpatient hospital stay due to iv pain control, iv fluids    Discharge Plan: active    Code Status: Level 1 - Full Code      Subjective:   Seen examined at bedside  In significant pain  She is having chest pain but tenderness to palpation  Not your typical chest pain associated with heart attacks  Seems to be due to sickling  Not hypoxic  Objective:     Vitals:   Temp (24hrs), Av 7 °F (37 1 °C), Min:98 5 °F (36 9 °C), Max:99 °F (37 2 °C)    Temp:  [98 5 °F (36 9 °C)-99 °F (37 2 °C)] 98 5 °F (36 9 °C)  HR:  [72-82] 72  Resp:  [17-20] 17  BP: (111-135)/(76) 126/76  SpO2:  [99 %-100 %] 99 %  There is no height or weight on file to calculate BMI  Input and Output Summary (last 24 hours): Intake/Output Summary (Last 24 hours) at 2021 1322  Last data filed at 2021 1930  Gross per 24 hour   Intake 985 ml   Output --   Net 985 ml       Physical Exam:     Physical Exam  Vitals reviewed  Constitutional:       Appearance: She is obese  She is ill-appearing  HENT:      Head: Normocephalic  Nose: Nose normal       Mouth/Throat:      Mouth: Mucous membranes are moist    Eyes:      General: No scleral icterus  Cardiovascular:      Rate and Rhythm: Normal rate and regular rhythm  Comments: Tenderness to palpation left chest  Pulmonary:      Effort: Pulmonary effort is normal  No respiratory distress  Breath sounds: No wheezing or rales  Abdominal:      General: There is no distension  Palpations: Abdomen is soft  Tenderness: There is no abdominal tenderness  Musculoskeletal:      Right lower leg: No edema  Left lower leg: No edema  Skin:     General: Skin is warm  Neurological:      Mental Status: She is alert  Mental status is at baseline     Psychiatric:         Mood and Affect: Mood normal          Behavior: Behavior normal        Additional Data:     Labs:    Results from last 7 days   Lab Units 21  0538 07/10/21  1916 WBC Thousand/uL 4 33 13 48*   HEMOGLOBIN g/dL 9 8* 11 1*   HEMATOCRIT % 29 9* 33 7*   PLATELETS Thousands/uL 198 251   BANDS PCT %  --  4   NEUTROS PCT % 46  --    LYMPHS PCT % 39  --    LYMPHO PCT %  --  9*   MONOS PCT % 11  --    MONO PCT %  --  8   EOS PCT % 2 0     Results from last 7 days   Lab Units 07/14/21  0538   SODIUM mmol/L 138   POTASSIUM mmol/L 3 6   CHLORIDE mmol/L 104   CO2 mmol/L 28   BUN mg/dL 4*   CREATININE mg/dL 0 67   ANION GAP mmol/L 6   CALCIUM mg/dL 8 4   ALBUMIN g/dL 2 9*   TOTAL BILIRUBIN mg/dL 0 67   ALK PHOS U/L 38*   ALT U/L 17   AST U/L 19   GLUCOSE RANDOM mg/dL 85         Results from last 7 days   Lab Units 07/14/21  1047 07/14/21  0719 07/13/21  2104 07/13/21  1631 07/13/21  1056 07/13/21  0714 07/12/21  2128 07/12/21  1557 07/12/21  1114 07/12/21  0744 07/11/21  2053 07/11/21  1702   POC GLUCOSE mg/dl 93 91 93 90 85 82 86 103 96 92 102 95     Results from last 7 days   Lab Units 07/11/21  0755   HEMOGLOBIN A1C % 4 7     Results from last 7 days   Lab Units 07/13/21  0534 07/12/21  0605   PROCALCITONIN ng/ml 0 05 <0 05           * I Have Reviewed All Lab Data Listed Above  * Additional Pertinent Lab Tests Reviewed:  Rishi 66 Admission Reviewed    Imaging:    Imaging Reports Reviewed Today Include: xr knee and pelvis    Recent Cultures (last 7 days):           Last 24 Hours Medication List:   Current Facility-Administered Medications   Medication Dose Route Frequency Provider Last Rate    acetaminophen  650 mg Oral Q6H PRN Ed MD Estuardo      enoxaparin  40 mg Subcutaneous Q24H [address] Sujit Valles MD      insulin lispro  1-5 Units Subcutaneous TID AC Ed MD Estuardo      insulin lispro  1-5 Units Subcutaneous HS Ed MD Estuardo      morphine injection  2 mg Intravenous Q3H PRN Ed MD Estuardo      Or    morphine injection  4 mg Intravenous Q3H PRN Ed MD Estuardo      ondansetron  4 mg Intravenous Q6H PRN Ed MD Estuardo      sodium chloride 75 mL/hr Intravenous Continuous [place] MD Tomasa 75 mL/hr (07/14/21 7983)        Today, Patient Was Seen By: Enrrique Monte MD    ** Please Note: Dictation voice to text software may have been used in the creation of this document   **

## 2021-07-14 NOTE — CASE MANAGEMENT
GMLOS: none documented              LOS: 2  BUNDLED? no  UNPLANNED READMISSION LEVEL: low  30 DAY READMISSION? No    Pt admitted in [facility] continues with significant pain and functional decline due to same  Therapy ordered to assist with possible dc needs  Pt recently moved from [facility] to friends home where she is staying in a room- has limited income (SSI from father who was in [address], [place], [place] [place])- CM provided contact information for both [place] as well as [employer] office as pt will need to update her address as well as inquire on available services to her in the [facility]  All information placed on AVS for future reference as well  Per pt, she was diagnosed with an intellectual disability at a young age while in [place] and attended special education classes, graduating from high school- can not recall the name of school nor has knowledge of the actual results of the assessment labeling her as having the disability- attending notified of same  Denies having family who she can contact as she is estranged from any family that she knows of (friend confirms same)  Knows she has been hospitalized in [place], she believes at [facility] in [facility], but is not 500% certain of same either- no information available through care everywhere  Pt has no PCP in the area with discussion had re: [facility] and pt being agreeable to follow up with same- CM to send electronic message on discharge so to establish care with information placed on AVS   Pt unsure if to follow up with Dr Yesi Cage for her sickle cell as she has seen him here in the hospital vs her seeing Dr Cherelle Kong in same location as [facility]- both contact information placed on AVS for pt to decide at another time       PTA, pt states she was independent with all aspects of care, ambulating without an AD independently- she voices requiring assistance currently due to amount of pain she is in with functioning nowhere near her baseline- PT/OT evals ordered so to assist with possible d/c needs  Pt will use [facility] on [address] for prescriptions on d/c- pt states she will need medications renewed if possible on d/c- sticky note left in EHR for attending re: same  Pt's friend will assist with transport home on d/c  No further questions/concerns voiced at present  Will continue to follow to assist with dc poc

## 2021-07-14 NOTE — ASSESSMENT & PLAN NOTE
Lab Results   Component Value Date    HGBA1C 4 7 07/11/2021       Recent Labs     07/13/21  1631 07/13/21  2104 07/14/21  0719 07/14/21  1047   POCGLU 90 93 91 93       Blood Sugar Average: Last 72 hrs:  (P) 97 [id number]    Not on medications  Diabetic diet  Sliding scale

## 2021-07-15 LAB
GLUCOSE SERPL-MCNC: 107 MG/DL (ref 65–140)
GLUCOSE SERPL-MCNC: 82 MG/DL (ref 65–140)
GLUCOSE SERPL-MCNC: 86 MG/DL (ref 65–140)
GLUCOSE SERPL-MCNC: 95 MG/DL (ref 65–140)
SICKLE CELLS BLD QL SMEAR: POSITIVE

## 2021-07-15 PROCEDURE — 99232 SBSQ HOSP IP/OBS MODERATE 35: CPT | Performed by: STUDENT IN AN ORGANIZED HEALTH CARE EDUCATION/TRAINING PROGRAM

## 2021-07-15 PROCEDURE — 97167 OT EVAL HIGH COMPLEX 60 MIN: CPT

## 2021-07-15 PROCEDURE — 97163 PT EVAL HIGH COMPLEX 45 MIN: CPT

## 2021-07-15 PROCEDURE — 82948 REAGENT STRIP/BLOOD GLUCOSE: CPT

## 2021-07-15 RX ADMIN — SODIUM CHLORIDE 75 ML/HR: 0.45 INJECTION, SOLUTION INTRAVENOUS at 15:13

## 2021-07-15 RX ADMIN — MORPHINE SULFATE 4 MG: 4 INJECTION INTRAVENOUS at 11:45

## 2021-07-15 RX ADMIN — STANDARDIZED SENNA CONCENTRATE 8.6 MG: 8.6 TABLET ORAL at 08:13

## 2021-07-15 RX ADMIN — STANDARDIZED SENNA CONCENTRATE 8.6 MG: 8.6 TABLET ORAL at 17:32

## 2021-07-15 RX ADMIN — MORPHINE SULFATE 4 MG: 4 INJECTION INTRAVENOUS at 15:13

## 2021-07-15 RX ADMIN — ENOXAPARIN SODIUM 40 MG: 40 INJECTION SUBCUTANEOUS at 08:13

## 2021-07-15 RX ADMIN — POLYETHYLENE GLYCOL 3350 17 G: 17 POWDER, FOR SOLUTION ORAL at 08:13

## 2021-07-15 NOTE — PLAN OF CARE
Problem: PHYSICAL THERAPY ADULT  Goal: Performs mobility at highest level of function for planned discharge setting  See evaluation for individualized goals  Description: Treatment/Interventions: Functional transfer training, LE strengthening/ROM, Elevations, Therapeutic exercise, Endurance training, Patient/family training, Bed mobility, Gait training, Spoke to nursing, OT  Equipment Recommended: Marysol Stout (RW)       See flowsheet documentation for full assessment, interventions and recommendations  Note: Prognosis: Fair  Problem List: Decreased strength, Decreased endurance, Impaired balance, Decreased mobility, Pain  Assessment: Pt  35 y  o female presented w/ chest pain & later moving down to her BLE  Pt admitted for Sickle Cell Crisis  Pt referred to PT for mobility assessment & D/C planning w/ orders of activity as tolerated  PTA, pt reports being I w/o AD  On eval, pt demonstrate dec mobility, balance, endurance & amb  Pt require minAx1 for most functional mobility + cues for techniques  Gait deviations as above, slow w/ shuffling gait but no gross LOB noted  Dec amb tolerance 2* to severe BLE pain  No dizziness reported t/o session  Nsg staff most recent vital signs as follows: /84 (BP Location: Right arm)   Pulse 74   Temp 98 5 °F (36 9 °C) (Oral)   Resp 16   Wt 86 1 kg (189 lb 13 1 oz)   SpO2 99%   At end of session, pt back in bed in stable condition, call bell & phone in reach  Fall precautions reinforced w/ good understanding  Pt functioning below baseline hence will continue skilled PT to improve function & safety  The patient's AM-PAC Basic Mobility Inpatient Short Form Raw Score is 17, Standardized Score is 39 67  A standardized score less than 42 9 suggests the patient may benefit from discharge to post-acute rehabilitation services  From PT standpoint, due to above mentioned deficits, inaccessible home, dec caregiver support & high risk for falls, pt will benefit from inpt rehab at D/C  CM to follow  Nsg staff to continue to mobilized pt (OOB in chair for all meals & ambulate in room/unit) as tolerated to prevent further decline in function  Nsg notified  Barriers to Discharge: Inaccessible home environment, Decreased caregiver support  Barriers to Discharge Comments: (+) stairs; ?level of support at home     PT Discharge Recommendation: Post acute rehabilitation services     PT - OK to Discharge: Yes (to STR when medically cleared)    See flowsheet documentation for full assessment

## 2021-07-15 NOTE — ASSESSMENT & PLAN NOTE
Lab Results   Component Value Date    HGBA1C 4 7 07/11/2021       Recent Labs     07/14/21  2051 07/15/21  0715 07/15/21  1123 07/15/21  1630   POCGLU 97 82 86 107       Blood Sugar Average: Last 72 hrs:  (P) 29 [id number]    Not on medications  Diabetic diet  Sliding scale

## 2021-07-15 NOTE — PLAN OF CARE
Problem: Potential for Falls  Goal: Patient will remain free of falls  Description: INTERVENTIONS:  - Educate patient/family on patient safety including physical limitations  - Instruct patient to call for assistance with activity   - Consult OT/PT to assist with strengthening/mobility   - Keep Call bell within reach  - Keep bed low and locked with side rails adjusted as appropriate  - Keep care items and personal belongings within reach  - Initiate and maintain comfort rounds  - Make Fall Risk Sign visible to staff  - Offer Toileting every 2 Hours, in advance of need  - Initiate/Maintain   alarm  - Obtain necessary fall risk management equipment:     - Apply yellow socks and bracelet for high fall risk patients  - Consider moving patient to room near nurses station  Outcome: Progressing     Problem: PAIN - ADULT  Goal: Verbalizes/displays adequate comfort level or baseline comfort level  Description: Interventions:  - Encourage patient to monitor pain and request assistance  - Assess pain using appropriate pain scale  - Administer analgesics based on type and severity of pain and evaluate response  - Implement non-pharmacological measures as appropriate and evaluate response  - Consider cultural and social influences on pain and pain management  - Notify physician/advanced practitioner if interventions unsuccessful or patient reports new pain  Outcome: Progressing     Problem: INFECTION - ADULT  Goal: Absence or prevention of progression during hospitalization  Description: INTERVENTIONS:  - Assess and monitor for signs and symptoms of infection  - Monitor lab/diagnostic results  - Monitor all insertion sites, i e  indwelling lines, tubes, and drains  - Monitor endotracheal if appropriate and nasal secretions for changes in amount and color  - [place] appropriate cooling/warming therapies per order  - Administer medications as ordered  - Instruct and encourage patient and family to use good hand hygiene technique  - Identify and instruct in appropriate isolation precautions for identified infection/condition  Outcome: Progressing  Goal: Absence of fever/infection during neutropenic period  Description: INTERVENTIONS:  - Monitor WBC    Outcome: Progressing     Problem: SAFETY ADULT  Goal: Maintain or return to baseline ADL function  Description: INTERVENTIONS:  -  Assess patient's ability to carry out ADLs; assess patient's baseline for ADL function and identify physical deficits which impact ability to perform ADLs (bathing, care of mouth/teeth, toileting, grooming, dressing, etc )  - Assess/evaluate cause of self-care deficits   - Assess range of motion  - Assess patient's mobility; develop plan if impaired  - Assess patient's need for assistive devices and provide as appropriate  - Encourage maximum independence but intervene and supervise when necessary  - Involve family in performance of ADLs  - Assess for home care needs following discharge   - Consider OT consult to assist with ADL evaluation and planning for discharge  - Provide patient education as appropriate  Outcome: Progressing  Goal: Maintains/Returns to pre admission functional level  Description: INTERVENTIONS:  - Perform BMAT or MOVE assessment daily    - Set and communicate daily mobility goal to care team and patient/family/caregiver  - Collaborate with rehabilitation services on mobility goals if consulted  - Perform Range of Motion 3 times a day  - Reposition patient every 2 hours    - Dangle patient 3 times a day  - Stand patient 3 times a day  - Ambulate patient 3 times a day  - Out of bed to chair 3 times a day   - Out of bed for meals 3 times a day  - Out of bed for toileting  - Record patient progress and toleration of activity level   Outcome: Progressing

## 2021-07-15 NOTE — ASSESSMENT & PLAN NOTE
35year old female presenting with sickle cell crisis  Pain worse today than yesterday     - Continue Pain control / bowel regimen  - IV hydration

## 2021-07-15 NOTE — NURSING NOTE
Received responsibility of patient care 7/15/2021 4:13 PM  No changes in patient status since previous assessment  Patient lying in bed with pain controlled at time  Vital signs stable  Will continue to monitor for duration of shift      Scottie Jj RN 7/15/2021 4:13 PM

## 2021-07-15 NOTE — PROGRESS NOTES
[address] [facility]  Progress Note - Garry Freeman 1980, 35 y o  female MRN: [id number]  Unit/Bed#: [place] 68 2 [facility] 87 221-01 Encounter: [id number]  Primary Care Provider: No primary care provider on file  Date and time admitted to hospital: 7/10/2021  6:43 PM    * Sickle cell anemia with pain [facility])  Assessment & Plan  35year old female presenting with sickle cell crisis  Pain worse today than yesterday  - Continue Pain control / bowel regimen  - IV hydration     Sickle cell anemia (HCC)  Assessment & Plan  Stable  Continue monitoring, no indication for transfusion at this time  Recent Labs     07/13/21  0534 07/14/21  0538   HGB 9 1* 9 8*   MCV 68* 68*   RDW 16 0* 15 6*         Diet-controlled diabetes mellitus [facility])  Assessment & Plan  Lab Results   Component Value Date    HGBA1C 4 7 07/11/2021       Recent Labs     07/14/21  2051 07/15/21  0715 07/15/21  1123 07/15/21  1630   POCGLU 97 82 86 107       Blood Sugar Average: Last 72 hrs:  (P) 61 [id number]    Not on medications  Diabetic diet  Sliding scale  VTE Pharmacologic Prophylaxis:   Pharmacologic: Enoxaparin (Lovenox)  Mechanical VTE Prophylaxis in Place: Yes    Patient Centered Rounds: I have performed bedside rounds with nursing staff today  Discussions with Specialists or Other Care Team Provider: nursing    Education and Discussions with Family / Patient: patient    Time Spent for Care: 30 minutes  More than 50% of total time spent on counseling and coordination of care as described above  Current Length of Stay: 3 day(s)    Current Patient Status: Inpatient   Certification Statement: The patient will continue to require additional inpatient hospital stay due to iv pain control, chest pain    Discharge Plan: 24-48 hours    Code Status: Level 1 - Full Code      Subjective:   Patient seen and examined at bedside  She continues to have chest pain  Repeat chest x-ray performed      Objective:     Vitals:   Temp (24hrs), Av 5 °F (36 9 °C), Min:98 3 °F (36 8 °C), Max:98 7 °F (37 1 °C)    Temp:  [98 3 °F (36 8 °C)-98 7 °F (37 1 °C)] 98 3 °F (36 8 °C)  HR:  [74-83] 83  Resp:  [16-18] 18  BP: (104-135)/(64-88) 104/64  SpO2:  [97 %-100 %] 97 %  There is no height or weight on file to calculate BMI  Input and Output Summary (last 24 hours):     No intake or output data in the 24 hours ending 07/15/21 1656    Physical Exam:     Physical Exam  Vitals reviewed  Constitutional:       General: She is not in acute distress  HENT:      Head: Normocephalic  Nose: Nose normal       Mouth/Throat:      Mouth: Mucous membranes are moist    Eyes:      General: No scleral icterus  Cardiovascular:      Rate and Rhythm: Normal rate and regular rhythm  Comments: Chest tenderness  Pulmonary:      Effort: Pulmonary effort is normal  No respiratory distress  Abdominal:      General: There is no distension  Palpations: Abdomen is soft  Tenderness: There is no abdominal tenderness  Skin:     General: Skin is warm  Neurological:      Mental Status: She is alert  Mental status is at baseline     Psychiatric:         Mood and Affect: Mood normal          Behavior: Behavior normal        Additional Data:     Labs:    Results from last 7 days   Lab Units 21  0538 07/10/21  1913   WBC Thousand/uL 4 33 13 48*   HEMOGLOBIN g/dL 9 8* 11 1*   HEMATOCRIT % 29 9* 33 7*   PLATELETS Thousands/uL 198 251   BANDS PCT %  --  4   NEUTROS PCT % 46  --    LYMPHS PCT % 39  --    LYMPHO PCT %  --  9*   MONOS PCT % 11  --    MONO PCT %  --  8   EOS PCT % 2 0     Results from last 7 days   Lab Units 21  0538   SODIUM mmol/L 138   POTASSIUM mmol/L 3 6   CHLORIDE mmol/L 104   CO2 mmol/L 28   BUN mg/dL 4*   CREATININE mg/dL 0 67   ANION GAP mmol/L 6   CALCIUM mg/dL 8 4   ALBUMIN g/dL 2 9*   TOTAL BILIRUBIN mg/dL 0 67   ALK PHOS U/L 38*   ALT U/L 17   AST U/L 19   GLUCOSE RANDOM mg/dL 85         Results from last 7 days   Lab Units 07/15/21  1630 07/15/21  1123 07/15/21  0715 07/14/21  2051 07/14/21  1552 07/14/21  1047 07/14/21  0719 07/13/21  2104 07/13/21  1631 07/13/21  1056 07/13/21  0714 07/12/21  2128   POC GLUCOSE mg/dl 107 86 82 97 101 93 91 93 90 85 82 86     Results from last 7 days   Lab Units 07/11/21  0755   HEMOGLOBIN A1C % 4 7     Results from last 7 days   Lab Units 07/13/21  0534 07/12/21  0605   PROCALCITONIN ng/ml 0 05 <0 05           * I Have Reviewed All Lab Data Listed Above  * Additional Pertinent Lab Tests Reviewed: Rishi 66 Admission Reviewed    Imaging:    Imaging Reports Reviewed Today Include: no new imaging    Recent Cultures (last 7 days):           Last 24 Hours Medication List:   Current Facility-Administered Medications   Medication Dose Route Frequency Provider Last Rate    acetaminophen  650 mg Oral Q6H PRN Susana Piper MD      enoxaparin  40 mg Subcutaneous Q24H [facility] Goyo Flood MD      insulin lispro  1-5 Units Subcutaneous TID AC Susana Piper MD      insulin lispro  1-5 Units Subcutaneous HS Susana Piper MD      morphine injection  2 mg Intravenous Q3H PRN Susana Piper MD      Or    morphine injection  4 mg Intravenous Q3H PRN Susana Piper MD      morphine injection  2 mg Intravenous Q4H PRN Goyo Flood MD      ondansetron  4 mg Intravenous Q6H PRN Susana Piper MD      polyethylene glycol  17 g Oral Daily Goyo Flood MD      senna  1 tablet Oral BID Goyo Flood MD      sodium chloride  75 mL/hr Intravenous Continuous Goyo Flood MD 75 mL/hr (07/15/21 1513)        Today, Patient Was Seen By: Roxanne Calvo MD    ** Please Note: Dictation voice to text software may have been used in the creation of this document   **

## 2021-07-15 NOTE — PLAN OF CARE
Problem: OCCUPATIONAL THERAPY ADULT  Goal: Performs self-care activities at highest level of function for planned discharge setting  See evaluation for individualized goals  Description: Treatment Interventions: ADL retraining, Functional transfer training, UE strengthening/ROM, Endurance training, Cognitive reorientation, Patient/family training, Equipment evaluation/education, Compensatory technique education, Continued evaluation          See flowsheet documentation for full assessment, interventions and recommendations  Note: Limitation: Decreased ADL status, Decreased UE strength, Decreased Safe judgement during ADL, Decreased cognition, Decreased endurance, Decreased high-level ADLs  Prognosis: Good  Assessment: Pt is a 40y/o female admitted to the hospital 2* symptoms of generalized pain  Pt noted with sickle cell anemia, b/l pleural effusions  PTA pt states independence with all aspects of her ADLs, transfers, ambulation--w/o device; neg , neg home alone, neg falls  During initial eval, pt demonstrated deficits with her functional balance, functional mobility, ADL status, transfer safety, b/l UE strength, activity tolerance(currently fair=15-20mins), and cognition(i e judgement/safety)  Pt would benefit from continued OT tx for the above deficits  3-5xwk/1-2wks        OT Discharge Recommendation: Post acute rehabilitation services

## 2021-07-15 NOTE — UTILIZATION REVIEW
Continued Stay Review    Date: 7/15/2021                       Current Patient Class: IP Current Level of Care: [facility]    HPI:41 y o  female initially admitted on 7/10 to Observation and Upgraded to IP 7/12  For management of  Sickle Cell crisis    Assessment/Plan:   7/13 Per Hem/Onc: Admitted with sickle cell crisis -may have been triggered by dehydration and her moving heavy objects  LE and knee pain today with ambulation  agree with hydration and pain control  Reticulocytes were slightly elevated  Hemoglobin is above 9 so no need for transfusion at this point    7/14 Pain worse today than yesterday  Reports chest pain - tender to palpation  Repeat CXR chest due to pain  EKG without acute ischemic changes  Vital Signs:   07/15/21 14:39:31  98 3 °F (36 8 °C)  83  18  104/64  77  97 % --   07/15/21 07:00:07  98 5 °F (36 9 °C)  74  16  135/84  101  99 % Lying   07/15/21 0642  98 5 °F (36 9 °C)  --  --  --  --  -- --   07/14/21 20:46:23  98 7 °F (37 1 °C)  78  18  133/88  103  100 % --   07/14/21 15:46:47  98 4 °F (36 9 °C)  72  17  122/78  93  99 % --   07/14/21 07:13:27  98 5 °F (36 9 °C)  72  17  126/76  93  99 %        Pertinent Labs/Diagnostic Results:     7/14 CXR: Minimal atelectasis or infiltrate at the right lung base  Suspected small bilateral pleural effusions    Results from last 7 days   Lab Units 07/14/21  0538 07/13/21  0534 07/12/21  0605 07/11/21  0755 07/10/21  1913 07/10/21  1913   WBC Thousand/uL 4 33 6 30 7 66 7 16  --  13 48*   HEMOGLOBIN g/dL 9 8* 9 1* 9 5* 9 8*  --  11 1*   HEMATOCRIT % 29 9* 27 9* 29 3* 29 7*  --  33 7*   PLATELETS Thousands/uL 198 145* 167 172  --  251   NEUTROS ABS Thousands/µL 2 05 3 79 5 00 4 92   < >  --    BANDS PCT %  --   --   --   --   --  4    < > = values in this interval not displayed       Results from last 7 days   Lab Units 07/10/21  1913   RETIC CT ABS  137,000*   RETIC CT PCT % 2 74*     Results from last 7 days   Lab Units 07/14/21  0538 07/13/21  0534 07/12/21  0605 07/11/21  0755 07/10/21  1913   SODIUM mmol/L 138 139 139 139 141   POTASSIUM mmol/L 3 6 3 5 3 3* 3 4* 3 5   CHLORIDE mmol/L 104 105 104 105 103   CO2 mmol/L 28 25 24 27 28   ANION GAP mmol/L 6 9 11 7 10   BUN mg/dL 4* 4* 4* 7 8   CREATININE mg/dL 0 67 0 60 0 64 0 69 0 76   EGFR ml/min/1 73sq m 125 129 126 123 111   CALCIUM mg/dL 8 4 8 2* 8 4 8 3 8 6   MAGNESIUM mg/dL 2 1 2 0 2 0  --   --      Results from last 7 days   Lab Units 07/14/21  0538 07/13/21  0534 07/12/21  0605 07/10/21  1913   AST U/L 19 26 29 30   ALT U/L 17 21 26 32   ALK PHOS U/L 38* 42* 44* 48   TOTAL PROTEIN g/dL 7 3 6 8 7 0 8 1   ALBUMIN g/dL 2 9* 2 8* 3 1* 4 0   TOTAL BILIRUBIN mg/dL 0 67 0 68 1 03* 0 76   BILIRUBIN DIRECT mg/dL  --  0 24*  --   --      Results from last 7 days   Lab Units 07/15/21  1123 07/15/21  0715 07/14/21  2051 07/14/21  1552 07/14/21  1047 07/14/21  0719 07/13/21  2104 07/13/21  1631 07/13/21  1056 07/13/21  0714 07/12/21  2128 07/12/21  1557   POC GLUCOSE mg/dl 86 82 97 101 93 91 93 90 85 82 86 103     Results from last 7 days   Lab Units 07/14/21  0538 07/13/21  0534 07/12/21  0605 07/11/21  0755 07/10/21  1913   GLUCOSE RANDOM mg/dL 85 81 99 104 120     Results from last 7 days   Lab Units 07/11/21  0755   HEMOGLOBIN A1C % 4 7   EAG mg/dl 88     Results from last 7 days   Lab Units 07/10/21  1913   TROPONIN I ng/mL <0 02     Results from last 7 days   Lab Units 07/13/21  0534 07/12/21  0605   PROCALCITONIN ng/ml 0 05 <0 05     Results from last 7 days   Lab Units 07/10/21  2045   CLARITY UA  Cloudy   COLOR UA  Yellow   SPEC GRAV UA  1 020   PH UA  5 5   GLUCOSE UA mg/dl Negative   KETONES UA mg/dl 40 (2+)*   BLOOD UA  Trace*   PROTEIN UA mg/dl Negative   NITRITE UA  Negative   BILIRUBIN UA  Negative   UROBILINOGEN UA E U /dl 1 0   LEUKOCYTES UA  Negative   WBC UA /hpf 0-1*   RBC UA /hpf 0-1*   BACTERIA UA /hpf Innumerable*   EPITHELIAL CELLS WET PREP /hpf Occasional       Medications: Scheduled Medications:  enoxaparin, 40 mg, Subcutaneous, Q24H KEKE  insulin lispro, 1-5 Units, Subcutaneous, TID AC  insulin lispro, 1-5 Units, Subcutaneous, HS  polyethylene glycol, 17 g, Oral, Daily  senna, 1 tablet, Oral, BID    Continuous IV Infusions:  sodium chloride, 75 mL/hr, Intravenous, Continuous    PRN Meds:  acetaminophen, 650 mg, Oral, Q6H PRN  morphine injection, 2 mg, Intravenous, Q3H PRN   Or  morphine injection, 4 mg, Intravenous, Q3H PRN  X 2 thus far today  ( x 5 7/14)   morphine injection, 2 mg, Intravenous, Q4H PRN  ondansetron, 4 mg, Intravenous, Q6H PRN    Discharge Plan: D    Network Utilization Review Department  ATTENTION: Please call with any questions or concerns to [phone] and carefully listen to the prompts so that you are directed to the right person  All voicemails are confidential   Shoshana List all requests for admission clinical reviews, approved or denied determinations and any other requests to dedicated fax number below belonging to the campus where the patient is receiving treatment   List of dedicated fax numbers for the Facilities:  [address] DENIALS (Administrative/Medical Necessity) [phone]   [address] (Maternity/NICU/Pediatrics) [phone] [address] Daphney Alexander 1768 [address] [place] [address] [place] [address] [facility] [address] [facility] [phone]

## 2021-07-15 NOTE — ASSESSMENT & PLAN NOTE
Stable  Continue monitoring, no indication for transfusion at this time      Recent Labs     07/13/21  0534 07/14/21  0538   HGB 9 1* 9 8*   MCV 68* 68*   RDW 16 0* 15 6* No

## 2021-07-15 NOTE — PHYSICAL THERAPY NOTE
PT EVALUATION    Pt  Name: Ad Carrion  Pt  Age: 35 y o  MRN: [id number]  LENGTH OF STAY: 3    Patient Active Problem List   Diagnosis    Sickle cell anemia with pain ([facility] )    Diet-controlled diabetes mellitus ([place] [address] )    Sickle cell anemia ([place] [address] )       Admitting Diagnoses:   Back pain [M54 9]  Chest pain [R07 9]  Sickle cell pain crisis ([facility] ) [D57 00]  Acute back pain [M54 9]    Past Medical History:   Diagnosis Date    Sickle cell disease ([place] [address] )        Past Surgical History:   Procedure Laterality Date    NO PAST SURGERIES         Imaging Studies:  XR chest portable   Final Result by Saúl Cordero MD (07/14 1458)      Minimal atelectasis or infiltrate at the right lung base  Suspected small bilateral pleural effusions  Workstation performed: [id number]         XR pelvis ap only 1 or 2 vw   Final Result by Aditya Tang MD (07/13 1624)      No acute osseous abnormality  Workstation performed: [id number]         XR knee 1 or 2 vw left   Final Result by Aditya Tang MD (07/13 1624)      No acute osseous abnormality  Workstation performed: [id number]         XR knee 1 or 2 vw right   Final Result by Aditya Tang MD (07/13 1625)      No acute osseous abnormality  Workstation performed: [id number]         XR chest 2 views   ED Interpretation by Cal Cabrera MD (07/10 2003)   Primary reviweed No acute abnormality      Final Result by Kisha Corley MD (07/11 1910)      No acute cardiopulmonary disease  Workstation performed: [id number]              07/15/21 1005   PT Last Visit   PT Visit Date 07/15/21   Note Type   Note type Evaluation   Pain Assessment   Pain Score Worst Possible Pain   Pain Location/Orientation Orientation: Bilateral;Location: Hip;Location: Chest;Location: Foot   Hospital Pain Intervention(s) Repositioned; Ambulation/increased activity; Emotional support; Rest  (RN aware)   Home Living   Type of Home House   Home Layout Multi-level;Stairs to enter with rails  (2STE; 3FOS to 3rd flr bedroom)   Bathroom Shower/Tub Tub/shower unit   Bathroom Toilet Standard   Bathroom Equipment Other (Comment)  (no DME)   Home Equipment Other (Comment)  (no DME)   Additional Comments Pt [facility] moved [facility]  Currently staying at friend's house  Prior Function   Level of [place] Independent with ADLs and functional mobility  (w/o AD)   Lives With Friend(s)  (lives w/ her friend & her friend's nephew)   Receives Help From Friend(s)   ADL Assistance Independent   Falls in the last 6 months 0   Vocational On disability   Restrictions/Precautions   Weight Bearing Precautions Per Order No   Other Precautions Fall Risk;Pain  ((+) intellectual disability)   General   Family/Caregiver Present No   Cognition   Arousal/Participation Alert   Orientation Level Oriented to person;Oriented to place;Oriented to time   Following Commands Follows one step commands without difficulty   Comments Pt pleasant & cooperative; (+) intellectual disability; (+) speech impediment   RUE Assessment   RUE Assessment   (refer to OT)   LUE Assessment   LUE Assessment   (refer to OT)   RLE Assessment   RLE Assessment X  (limited tolerance to MMT 2* pain but appears at least 3+/5)   LLE Assessment   LLE Assessment X  (limited tolerance to MMT 2* pain but appears at least 3+/5)   Coordination   Movements are Fluid and Coordinated 1   Sensation WFL   Bed Mobility   Supine to Sit 4  Minimal assistance   Additional items Assist x 1;HOB elevated; Bedrails; Increased time required;Verbal cues;LE management   Sit to Supine 4  Minimal assistance   Additional items Assist x 1; Increased time required;Verbal cues;LE management; Bedrails   Additional Comments cues for techniques & safety   Transfers   Sit to Stand 4  Minimal assistance   Additional items Assist x 1;Bedrails; Increased time required;Verbal cues   Stand to Sit 4  Minimal assistance Additional items Assist x 1;Bedrails; Increased time required;Verbal cues   Additional Comments cues for techniques & safety   Ambulation/Elevation   Gait pattern Wide GLENN; Decreased foot clearance;Shuffling; Short stride; Excessively slow   Gait Assistance 4  Minimal assist   Additional items Assist x 1;Verbal cues; Tactile cues   Assistive Device Rolling walker   Distance 8'x1 (3' forward & back + 2' lateral steps to [facility])  (limited to pain)   Balance   Static Sitting Good   Dynamic Sitting Fair +   Static Standing Fair -  (w/ RW)   Dynamic Standing Poor +  (w/ RW)   Ambulatory Poor +  (w/ RW)   Endurance Deficit   Endurance Deficit Yes   Endurance Deficit Description pain   Activity Tolerance   Activity Tolerance Patient limited by pain;Treatment limited secondary to medical complications (Comment)   Medical Staff Made Aware OT Dillan   Nurse Made Aware NIKOLE Hunter   Assessment   Prognosis Fair   Problem List Decreased strength;Decreased endurance; Impaired balance;Decreased mobility;Pain   Assessment Pt  35 y  o female presented w/ chest pain & later moving down to her BLE  Pt admitted for Sickle Cell Crisis  Pt referred to PT for mobility assessment & D/C planning w/ orders of activity as tolerated  PTA, pt reports being I w/o AD  On eval, pt demonstrate dec mobility, balance, endurance & amb  Pt require minAx1 for most functional mobility + cues for techniques  Gait deviations as above, slow w/ shuffling gait but no gross LOB noted  Dec amb tolerance 2* to severe BLE pain  No dizziness reported t/o session  Nsg staff most recent vital signs as follows: /84 (BP Location: Right arm)   Pulse 74   Temp 98 5 °F (36 9 °C) (Oral)   Resp 16   Wt 86 1 kg (189 lb 13 1 oz)   SpO2 99%   At end of session, pt back in bed in stable condition, call bell & phone in reach  Fall precautions reinforced w/ good understanding  Pt functioning below baseline hence will continue skilled PT to improve function & safety   The patient's AM-PAC Basic Mobility Inpatient Short Form Raw Score is 17, Standardized Score is 39 67  A standardized score less than 42 9 suggests the patient may benefit from discharge to post-acute rehabilitation services  From PT standpoint, due to above mentioned deficits, inaccessible home, dec caregiver support & high risk for falls, pt will benefit from inpt rehab at D/C  CM to follow  Nsg staff to continue to mobilized pt (OOB in chair for all meals & ambulate in room/unit) as tolerated to prevent further decline in function  Nsg notified  Barriers to Discharge Inaccessible home environment;Decreased caregiver support   Barriers to Discharge Comments (+) stairs; ?level of support at home   Goals   Patient Goals to have less pain   STG Expiration Date 07/25/21   Short Term Goal #1 Goals to be met in 10 days; pt will be able to: 1) inc strength & balance by 1/2 grade to improve overall functional mobility & dec fall risk; 2) inc bed mobility to modified I for pt to be able to get in/OOB safely w/ proper techniques 100% of the time, to dec caregiver burden & safely function at home; 3) inc transfers to modified I for pt to transition safely from one surface to another w/o % of the time, to dec caregiver burden & safely function at home; 4) inc amb w/ RW approx  >80' w/ S for pt to ambulate household distances w/o any % of the time, to dec caregiver burden & safely function at home; 5) negotiate stairs w/ S for inc safety during stair mgt inside/outside of home & dec caregiver burden; 6) pt/caregiver ed   PT Treatment Day 0   Plan   Treatment/Interventions Functional transfer training;LE strengthening/ROM; Elevations; Therapeutic exercise; Endurance training;Patient/family training;Bed mobility;Gait training;Spoke to nursing;OT   PT Frequency Other (Comment)  (3-5x/wk)   Recommendation   PT Discharge Recommendation Post acute rehabilitation services   Equipment Recommended Edgardo TOUSSAINT)   Smeet Recommended Wheeled walker   PT - OK to Discharge Yes  (to STR when medically cleared)   AM-PAC Basic Mobility Inpatient   Turning in Bed Without Bedrails 3   Lying on Back to Sitting on Edge of Flat Bed 3   Moving Bed to Chair 3   Standing Up From Chair 3   Walk in Room 3   Climb 3-5 Stairs 2   Basic Mobility Inpatient Raw Score 17   Basic Mobility Standardized Score 39 67   Hx/personal factors: co-morbidities, inaccessible home, dec caregiver support, use of AD, pain, fall risk and assist w/ ADL's  Examination: dec mobility, dec balance, dec endurance, dec amb, risk for falls, pain  Clinical: unpredictable (ongoing medical status, abnormal lab values, risk for falls and pain mgt)  Complexity: high     Iraida Magallanes, PT

## 2021-07-15 NOTE — OCCUPATIONAL THERAPY NOTE
Occupational Therapy Evaluation(time=0516-3960)     Patient Name: Manya Alpers  Today's Date: 7/15/2021  Problem List  Principal Problem:    Sickle cell anemia with pain ([facility] )  Active Problems:    Diet-controlled diabetes mellitus (HCC)    Sickle cell anemia (HCC)    Past Medical History  Past Medical History:   Diagnosis Date    Sickle cell disease ([facility] )      Past Surgical History  Past Surgical History:   Procedure Laterality Date    NO PAST SURGERIES          07/15/21 0945   Note Type   Note type Evaluation   Restrictions/Precautions   Weight Bearing Precautions Per Order No   Other Precautions Fall Risk;Pain   Pain Assessment   Pain Assessment Tool 0-10   Pain Score Worst Possible Pain   Pain Location/Orientation Orientation: Bilateral;Location: Leg   Pain Rating: FLACC (Rest) - Face 0   Pain Rating: FLACC (Rest) - Legs 0   Pain Rating: FLACC (Rest) - Activity 0   Pain Rating: FLACC (Rest) - Cry 1   Pain Rating: FLACC (Rest) - Consolability 0   Score: FLACC (Rest) 1   Home Living   Type of Home House   Home Layout Multi-level   Bathroom Shower/Tub Tub/shower unit   Bathroom Toilet Standard   Home Equipment   (denies)   Prior Function   Lives With Friend(s)   ADL Assistance Independent   Falls in the last 6 months 0   Lifestyle   Autonomy PTA pt states independence with all aspects of her ADLs, transfers, ambulation--w/o device; neg , neg home alone, neg falls   Reciprocal Relationships limited family support   Service to Others worked for [employer] in [address] [place] [address] watching TV   Psychosocial   Psychosocial (WDL) WDL   Subjective   Subjective "I usually have pain "   ADL   Where Assessed Edge of bed   Eating Assistance 6  Modified independent   Grooming Assistance 6  Modified Independent   UB Bathing Assistance 5  Supervision/Setup   LB Bathing Assistance 3  Moderate Assistance   UB Dressing Assistance 5  Supervision/Setup   LB Dressing Assistance 3  Moderate Assistance Bed Mobility   Supine to Sit 4  Minimal assistance   Additional items Assist x 1; Increased time required;Verbal cues;LE management   Sit to Supine 4  Minimal assistance   Additional items Assist x 1; Increased time required;Verbal cues;LE management   Transfers   Sit to Stand 4  Minimal assistance   Additional items Assist x 1; Increased time required;Verbal cues   Stand to Sit 4  Minimal assistance   Additional items Assist x 1;Trapeze bar;Verbal cues   Functional Mobility   Functional Mobility 4  Minimal assistance   Additional Comments x1   Additional items Rolling walker   Balance   Static Sitting Good   Dynamic Sitting Fair +   Static Standing Fair -   Dynamic Standing Poor +   Activity Tolerance   Activity Tolerance Patient limited by fatigue;Patient limited by pain   Medical Staff Made Aware nsg, P T     RUE Assessment   RUE Assessment WFL   RUE Strength   RUE Overall Strength Within Functional Limits - able to perform ADL tasks with strength  (4+/5 throughout)   LUE Assessment   LUE Assessment WFL   LUE Strength   LUE Overall Strength Within Functional Limits - able to perform ADL tasks with strength  (4+/5 throughout)   Hand Function   Gross Motor Coordination Functional   Fine Motor Coordination Functional   Sensation   Light Touch No apparent deficits   Proprioception   Proprioception No apparent deficits   Vision-Basic Assessment   Current Vision Does not wear glasses   Vision - Complex Assessment   Acuity Able to read clock/calendar on wall without difficulty   Perception   Inattention/Neglect Appears intact   Cognition   Overall Cognitive Status Impaired   Arousal/Participation Alert   Attention Within functional limits   Orientation Level Oriented X4   Memory Decreased recall of precautions   Following Commands Follows one step commands without difficulty   Comments hx intellectual disability   Assessment   Limitation Decreased ADL status; Decreased UE strength;Decreased Safe judgement during ADL;Decreased cognition;Decreased endurance;Decreased high-level ADLs   Prognosis Good   Assessment Pt is a 40y/o female admitted to the hospital 2* symptoms of generalized pain  Pt noted with sickle cell anemia, b/l pleural effusions  PTA pt states independence with all aspects of her ADLs, transfers, ambulation--w/o device; neg , neg home alone, neg falls  During initial eval, pt demonstrated deficits with her functional balance, functional mobility, ADL status, transfer safety, b/l UE strength, activity tolerance(currently fair=15-20mins), and cognition(i e judgement/safety)  Pt would benefit from continued OT tx for the above deficits  3-5xwk/1-2wks  Goals   Patient Goals "to have less pain"   STG Time Frame   (1-7 days)   Short Term Goal #1 Pt will demonstrate improved activity tolerance to good(20-30mins) and standing tolerance to 3-5mins to assist with ADLs  Short Term Goal #2 Pt will tolerate continued cognitive/home-safety assessment and appropriate d/c recommendations will be provided  Short Term Goal  Pt will demonstrate mod I with their sit-stand transfers to assist with completion of their LE dressing  LTG Time Frame   (7-14 days)   Long Term Goal #1 Pt will demonstrate proper walker/transfer safety 100% of the time  Long Term Goal #2 Pt will demonstrate g/g- balance with all functional activities  Long Term Goal Pt will demonstrate mod I with their UE and LE bathing/dresssing  Plan   Treatment Interventions ADL retraining;Functional transfer training;UE strengthening/ROM; Endurance training;Cognitive reorientation;Patient/family training;Equipment evaluation/education; Compensatory technique education;Continued evaluation   Goal Expiration Date 07/29/21   OT Treatment Day 0   OT Frequency 3-5x/wk   Recommendation   OT Discharge Recommendation Post acute rehabilitation services   AM-PAC Daily Activity Inpatient   Lower Body Dressing 2   Bathing 2   Toileting 2   Upper Body Dressing 3   Grooming 4   Eating 4   Daily Activity Raw Score 17   Daily Activity Standardized Score (Calc for Raw Score >=11) 37 26   AM-PAC Applied Cognition Inpatient   Following a Speech/Presentation 3   Understanding Ordinary Conversation 3   Taking Medications 3   Remembering Where Things Are Placed or Put Away 3   Remembering List of 4-5 Errands 2   Taking Care of Complicated Tasks 2   Applied Cognition Raw Score 16   Applied Cognition Standardized Score 35 03   Elvin Mckinley, OT

## 2021-07-16 PROBLEM — R22.31 LOCALIZED SWELLING OF RIGHT UPPER EXTREMITY: Status: ACTIVE | Noted: 2021-07-16

## 2021-07-16 LAB
GLUCOSE SERPL-MCNC: 84 MG/DL (ref 65–140)
GLUCOSE SERPL-MCNC: 87 MG/DL (ref 65–140)
GLUCOSE SERPL-MCNC: 90 MG/DL (ref 65–140)

## 2021-07-16 PROCEDURE — 82948 REAGENT STRIP/BLOOD GLUCOSE: CPT

## 2021-07-16 PROCEDURE — 97110 THERAPEUTIC EXERCISES: CPT

## 2021-07-16 PROCEDURE — 97535 SELF CARE MNGMENT TRAINING: CPT

## 2021-07-16 PROCEDURE — 97530 THERAPEUTIC ACTIVITIES: CPT

## 2021-07-16 PROCEDURE — 97116 GAIT TRAINING THERAPY: CPT

## 2021-07-16 PROCEDURE — 99232 SBSQ HOSP IP/OBS MODERATE 35: CPT | Performed by: STUDENT IN AN ORGANIZED HEALTH CARE EDUCATION/TRAINING PROGRAM

## 2021-07-16 RX ORDER — OXYCODONE HYDROCHLORIDE AND ACETAMINOPHEN 5; 325 MG/1; MG/1
1 TABLET ORAL EVERY 4 HOURS PRN
Status: DISCONTINUED | OUTPATIENT
Start: 2021-07-16 | End: 2021-07-18 | Stop reason: SDUPTHER

## 2021-07-16 RX ADMIN — ENOXAPARIN SODIUM 40 MG: 40 INJECTION SUBCUTANEOUS at 10:00

## 2021-07-16 RX ADMIN — MORPHINE SULFATE 4 MG: 4 INJECTION INTRAVENOUS at 17:15

## 2021-07-16 RX ADMIN — POLYETHYLENE GLYCOL 3350 17 G: 17 POWDER, FOR SOLUTION ORAL at 10:00

## 2021-07-16 RX ADMIN — MORPHINE SULFATE 4 MG: 4 INJECTION INTRAVENOUS at 10:37

## 2021-07-16 RX ADMIN — MORPHINE SULFATE 4 MG: 4 INJECTION INTRAVENOUS at 05:30

## 2021-07-16 RX ADMIN — MORPHINE SULFATE 4 MG: 4 INJECTION INTRAVENOUS at 00:38

## 2021-07-16 RX ADMIN — MORPHINE SULFATE 4 MG: 4 INJECTION INTRAVENOUS at 22:10

## 2021-07-16 RX ADMIN — STANDARDIZED SENNA CONCENTRATE 8.6 MG: 8.6 TABLET ORAL at 17:21

## 2021-07-16 RX ADMIN — STANDARDIZED SENNA CONCENTRATE 8.6 MG: 8.6 TABLET ORAL at 10:00

## 2021-07-16 NOTE — ASSESSMENT & PLAN NOTE
Stable  Continue monitoring, no indication for transfusion at this time      Recent Labs     07/14/21  0538   HGB 9 8*   MCV 68*   RDW 15 6*

## 2021-07-16 NOTE — ASSESSMENT & PLAN NOTE
Possibly due to IV infiltration  Supportive care    - VAS right upper extremity duplex  - Warm compress as needed, supportive care, elevate

## 2021-07-16 NOTE — PHYSICAL THERAPY NOTE
PT PROGRESS NOTE    Name: Beena Daily  AGE: 35 y o  MRN: [id number]  LENGTH OF STAY: 4    Admitting Dx:  Back pain [M54 9]  Chest pain [R07 9]  Sickle cell pain crisis ([facility] ) [D57 00]  Acute back pain [M54 9]      Patient Active Problem List   Diagnosis    Sickle cell anemia with pain ([facility] )    Diet-controlled diabetes mellitus ([facility] )    Sickle cell anemia ([facility] )              07/16/21 1520   PT Last Visit   PT Visit Date 07/16/21   Note Type   Note Type Treatment   Pain Assessment   Pain Score Worst Possible Pain   Pain Location/Orientation Orientation: Bilateral;Location: Chest;Location: Hip;Location: Foot   Hospital Pain Intervention(s) Repositioned; Ambulation/increased activity; Emotional support; Rest  (RN notified)   Restrictions/Precautions   Weight Bearing Precautions Per Order No   Other Precautions Multiple lines; Fall Risk;Pain   General   Chart Reviewed Yes   Response to Previous Treatment Patient reporting fatigue but able to participate  Family/Caregiver Present No   Cognition   Overall Cognitive Status Impaired   Arousal/Participation Alert; Cooperative   Attention Within functional limits   Orientation Level Oriented X4   Following Commands Follows multistep commands with increased time or repetition   Comments Pt pleasant & cooperative; pt w/ intellectual disability & speech impediment   Subjective   Subjective Pt agreeable to therapy despite pain  Bed Mobility   Supine to Sit 4  Minimal assistance   Additional items Assist x 1;HOB elevated; Bedrails; Increased time required;Verbal cues;LE management   Sit to Supine 4  Minimal assistance   Additional items Assist x 1; Increased time required;Verbal cues;LE management   Additional Comments cues for techniques & safety   Transfers   Sit to Stand 4  Minimal assistance   Additional items Assist x 1;Bedrails; Increased time required;Verbal cues;Armrests   Stand to Sit 4  Minimal assistance   Additional items Assist x 1; Armrests; Increased time required;Verbal cues   Additional Comments cues for techniques & safety   Ambulation/Elevation   Gait pattern Wide GLENN; Decreased foot clearance; Short stride; Excessively slow   Gait Assistance 4  Minimal assist   Additional items Assist x 1;Verbal cues; Tactile cues   Assistive Device Rolling walker   Distance 15'x1 + 5'x1  (seated rest in between amb trials)   Balance   Static Sitting Good   Dynamic Sitting Fair +   Static Standing Fair -  (w/ RW)   Dynamic Standing Poor +  (w/ RW)   Ambulatory Poor +  (w/ RW)   Endurance Deficit   Endurance Deficit Yes   Endurance Deficit Description pain   Activity Tolerance   Activity Tolerance Patient limited by pain; Patient limited by fatigue   Medical Staff [address]   Nurse Made Aware RN Inge   Exercises   Hip Flexion Sitting;10 reps;AROM; Bilateral   Hip Abduction Sitting;10 reps;AAROM; Bilateral   Hip Adduction Sitting;10 reps;AAROM; Bilateral   Knee AROM Long Arc Quad Sitting;10 reps;AROM; Bilateral   Ankle Pumps Sitting;10 reps;AROM; Bilateral   Assessment   Prognosis Fair   Problem List Decreased strength;Decreased endurance; Impaired balance;Decreased mobility;Pain;Decreased skin integrity   Assessment Pt seen for PT per POC  Improved mobility & activity tolerance noted this tx session  Pt require minAx1 for most functional mobility including amb w/ RW  Pt continue to require cues for overall mobility techniques & safety  Pt tolerated static standing activity approx  2mins w/ CGAx1 for safety  See above levels of assistance required for all functional tasks  Gait deviations as above, slow w/ dec foot clearance & strides but no gross LOB noted  Improved amb tolerance this tx session compared to previous PT session  Pt require seated rest in amb trials 2* to pain & fatigue  Pt tolerated above mentioned thera  ex well, AROM except hip abduction/adduction require AAROM  Pt require prolonged rest periods in between tasks & exercises 2* to pain & fatigue   Noted MANDY swelling, RN notified  Nsg staff most recent vital signs as follows: /75   Pulse 78   Temp 98 2 °F (36 8 °C) (Oral)   Resp 18   Wt 85 9 kg (189 lb 6 oz)   SpO2 97%   Will continue PT per POC  At end of session, pt back in bed in stable condition, call bell & phone in reach, all lines intact  Fall precautions reinforced w/ good understanding  The patient's AM-PAC Basic Mobility Inpatient Short Form Raw Score is 17, Standardized Score is 39 67  A standardized score less than 42 9 suggests the patient may benefit from discharge to post-acute rehabilitation services  From PT standpoint, will continue to recommend inpt rehab at D/C   to follow  Ns staff to continue to mobilized pt (OOB in chair for all meals & ambulate in room/unit) as tolerated to prevent decline in function  Nsg notified  Barriers to Discharge Inaccessible home environment;Decreased caregiver support   Barriers to Discharge Comments (+) stairs; ?level of support at home   Goals   Patient Goals to get better   STG Expiration Date 07/25/21   PT Treatment Day 1   Plan   Treatment/Interventions Functional transfer training;LE strengthening/ROM; Elevations; Therapeutic exercise; Endurance training;Patient/family training;Bed mobility;Gait training;Spoke to nursing;OT   Progress Progressing toward goals   PT Frequency Other (Comment)  (3-5x/wk)   Recommendation   PT Discharge Recommendation Post acute rehabilitation services   Equipment Recommended [facility] Recommended Wheeled walker   PT - OK to Discharge Yes  (to STR when medically cleared)   AM-PAC Basic Mobility Inpatient   Turning in Bed Without Bedrails 3   Lying on Back to Sitting on Edge of Flat Bed 3   Moving Bed to Chair 3   Standing Up From Chair 3   Walk in Room 3   Climb 3-5 Stairs 2   Basic Mobility Inpatient Raw Score 17   Basic Mobility Standardized Score 39 67   Nick Hendrix, PT

## 2021-07-16 NOTE — PLAN OF CARE
Problem: OCCUPATIONAL THERAPY ADULT  Goal: Performs self-care activities at highest level of function for planned discharge setting  See evaluation for individualized goals  Description: Occupational Therapy    Outcome: Progressing  Note: Limitation: Decreased ADL status, Decreased UE strength, Decreased Safe judgement during ADL, Decreased cognition, Decreased endurance, Decreased high-level ADLs  Prognosis: Good  Assessment: Pt was seen for skilled OT with focus on completion of dynamic stand balance activities, functional reach activities, review of RUE ROM, stress ball exercises, fine motor coordination activities and review of current plan of care  Pt able to tolerate functional stand balance activities with encouragement to reach table top for playing cards to supinate and pronate RUE due to noted edema and limited functional grasp  Extended time required to complete activity with gross functional grasp in RUE  Pt able to supinate cards after picking up with RUE and hold RW with LUE with F quality  See above levels of A required for all functional tasks  Pt required Min A x1 to achieve OOB positioning in bedside chair  Slow controlled movements noted with activities  Reported increased edema and swelling of the inner forearm at IV site to RN  Pt able to tolerate functional tasks with use of RUE without further increase in pain levels  The patient's raw score on the AM-PAC Daily Activity inpatient short form is 17, standardized score is 37 26, less than 39 4  Patients at this level are likely to benefit from discharge to post-acute rehabilitation services        OT Discharge Recommendation: Post acute rehabilitation services

## 2021-07-16 NOTE — PROGRESS NOTES
[facility]  Progress Note - Fawad Angel 1980, 35 y o  female MRN: [id number]  Unit/Bed#: [facility] 2 [facility] [id number] Encounter: [id number]  Primary Care Provider: No primary care provider on file  Date and time admitted to hospital: 7/10/2021  6:43 PM    * Sickle cell anemia with pain [facility])  Assessment & Plan  35year old female presenting with sickle cell crisis  Pain worse today than yesterday  - Continue Pain control / bowel regimen  - Started po for moderate pain will bridge her this weekend  - Titrated pain regimen today  - Discontinued IVF  Sickle cell anemia (HCC)  Assessment & Plan  Stable  Continue monitoring, no indication for transfusion at this time  Recent Labs     07/14/21  0538   HGB 9 8*   MCV 68*   RDW 15 6*         Localized swelling of right upper extremity  Assessment & Plan  Possibly due to IV infiltration  Supportive care  - VAS right upper extremity duplex  - Warm compress as needed, supportive care, elevate    Diet-controlled diabetes mellitus [facility])  Assessment & Plan  Lab Results   Component Value Date    HGBA1C 4 7 07/11/2021       Recent Labs     07/15/21  1123 07/15/21  1630 07/15/21  2131 07/16/21  0732   POCGLU 86 107 95 90       Blood Sugar Average: Last 72 hrs:  (P) 91 [id number]    Not on medications  Diabetic diet  Sliding scale  VTE Pharmacologic Prophylaxis:   Pharmacologic: Enoxaparin (Lovenox)  Mechanical VTE Prophylaxis in Place: Yes    Patient Centered Rounds: I have performed bedside rounds with nursing staff today  Discussions with Specialists or Other Care Team Provider: Nursing    Education and Discussions with Family / Patient: patient    Time Spent for Care: 30 minutes  More than 50% of total time spent on counseling and coordination of care as described above      Current Length of Stay: 4 day(s)    Current Patient Status: Inpatient   Certification Statement: The patient will continue to require additional inpatient hospital stay due to iv pain control, iv hydration    Discharge Plan: active    Code Status: Level 1 - Full Code      Subjective:   Patient seen and examined at bedside  States that she still has her hip and knee pain  She does not complain of any right upper extremity pain, but looks visibly swollen  Objective:     Vitals:   Temp (24hrs), Av 4 °F (36 9 °C), Min:98 2 °F (36 8 °C), Max:98 5 °F (36 9 °C)    Temp:  [98 2 °F (36 8 °C)-98 5 °F (36 9 °C)] 98 2 °F (36 8 °C)  HR:  [65-78] 78  Resp:  [18] 18  BP: (114-118)/(75-78) 118/75  SpO2:  [97 %] 97 %  There is no height or weight on file to calculate BMI  Input and Output Summary (last 24 hours):     No intake or output data in the 24 hours ending 21 1627    Physical Exam:     Physical Exam  Vitals reviewed  Constitutional:       General: She is not in acute distress  Appearance: She is obese  HENT:      Head: Normocephalic  Nose: Nose normal       Mouth/Throat:      Mouth: Mucous membranes are moist    Eyes:      General: No scleral icterus  Cardiovascular:      Rate and Rhythm: Normal rate and regular rhythm  Pulmonary:      Effort: Pulmonary effort is normal  No respiratory distress  Breath sounds: No wheezing or rales  Abdominal:      General: There is no distension  Palpations: Abdomen is soft  Tenderness: There is no abdominal tenderness  Musculoskeletal:         General: Swelling (Right upper extremity) present  Skin:     General: Skin is warm  Neurological:      Mental Status: She is alert  Mental status is at baseline     Psychiatric:         Mood and Affect: Mood normal          Behavior: Behavior normal        Additional Data:     Labs:    Results from last 7 days   Lab Units 21  0538 07/10/21  1913   WBC Thousand/uL 4 33 13 48*   HEMOGLOBIN g/dL 9 8* 11 1*   HEMATOCRIT % 29 9* 33 7*   PLATELETS Thousands/uL 198 251   BANDS PCT %  --  4   NEUTROS PCT % 46  --    LYMPHS PCT % 39  --    LYMPHO PCT %  --  9*   MONOS PCT % 11  --    MONO PCT %  --  8   EOS PCT % 2 0     Results from last 7 days   Lab Units 07/14/21  0538   SODIUM mmol/L 138   POTASSIUM mmol/L 3 6   CHLORIDE mmol/L 104   CO2 mmol/L 28   BUN mg/dL 4*   CREATININE mg/dL 0 67   ANION GAP mmol/L 6   CALCIUM mg/dL 8 4   ALBUMIN g/dL 2 9*   TOTAL BILIRUBIN mg/dL 0 67   ALK PHOS U/L 38*   ALT U/L 17   AST U/L 19   GLUCOSE RANDOM mg/dL 85         Results from last 7 days   Lab Units 07/16/21  0732 07/15/21  2131 07/15/21  1630 07/15/21  1123 07/15/21  0715 07/14/21  2051 07/14/21  1552 07/14/21  1047 07/14/21  0719 07/13/21  2104 07/13/21  1631 07/13/21  1056   POC GLUCOSE mg/dl 90 95 107 86 82 97 101 93 91 93 90 85     Results from last 7 days   Lab Units 07/11/21  0755   HEMOGLOBIN A1C % 4 7     Results from last 7 days   Lab Units 07/13/21  0534 07/12/21  0605   PROCALCITONIN ng/ml 0 05 <0 05           * I Have Reviewed All Lab Data Listed Above  * Additional Pertinent Lab Tests Reviewed:  [facility] Admission Reviewed    Imaging:    Imaging Reports Reviewed Today Include: no new imaging    Recent Cultures (last 7 days):           Last 24 Hours Medication List:   Current Facility-Administered Medications   Medication Dose Route Frequency Provider Last Rate    acetaminophen  650 mg Oral Q6H PRN Angelica Castro MD      enoxaparin  40 mg Subcutaneous Q24H [address] Sandrita Chávez MD      insulin lispro  1-5 Units Subcutaneous TID AC Angelica Castro MD      insulin lispro  1-5 Units Subcutaneous HS Angelica Castro MD      morphine injection  4 mg Intravenous Q3H PRN Angelica Castro MD      morphine injection  2 mg Intravenous Q4H PRN Sandrita Chávez MD      ondansetron  4 mg Intravenous Q6H PRN Angelica Castro MD      oxyCODONE-acetaminophen  1 tablet Oral Q4H PRN Sandrita Chávez MD      polyethylene glycol  17 g Oral Daily Sandrita Chávez MD      senna  1 tablet Oral BID Sandrita Chávez MD      sodium chloride  75 mL/hr Intravenous Continuous Humera Jameson MD 75 mL/hr (07/15/21 9153)        Today, Patient Was Seen By: Mackenzie Montez MD    ** Please Note: Dictation voice to text software may have been used in the creation of this document   **

## 2021-07-16 NOTE — OCCUPATIONAL THERAPY NOTE
Occupational Therapy Treatment Note:         07/16/21 1502   OT Last Visit   OT Visit Date 07/16/21   Note Type   Note Type Treatment   Restrictions/Precautions   Weight Bearing Precautions Per Order No   Other Precautions Pain; Fall Risk;Cognitive; Chair Alarm; Bed Alarm;Multiple lines  (intellegtual disability  )   Pain Assessment   Pain Assessment Tool 0-10   Pain Score Worst Possible Pain   Pain Location/Orientation Orientation: Bilateral;Location: Foot   ADL   Where Assessed Edge of bed   Grooming Assistance 5  Supervision/Setup   Grooming Deficit Setup;Verbal cueing;Supervision/safety; Increased time to complete;Wash/dry hands; Wash/dry face   Grooming Comments Pt able to complete with extended time provided  LB Dressing Assistance 3  Moderate Assistance   LB Dressing Deficit Setup;Supervision/safety;Verbal cueing; Increased time to complete; Don/doff L sock; Don/doff R sock   LB Dressing Comments increased A due to limited bilateral grasp  Toileting Assistance  Unable to assess   Functional Standing Tolerance   Time 2-3 mins   Activity dynamic stand balance activities  Comments Pt able to tolerate with functional reach to tray table positioned at counter top level  Bed Mobility   Supine to Sit 4  Minimal assistance   Additional items Assist x 1;Bedrails; Increased time required;Verbal cues;LE management   Additional Comments Pt may benefit from further review of techs to inhibit increased pain levels  Transfers   Sit to Stand 4  Minimal assistance   Additional items Assist x 1;Bedrails;Armrests; Increased time required;Verbal cues   Stand to Sit 4  Minimal assistance   Additional items Assist x 1;Bedrails; Increased time required;Armrests; Verbal cues   Stand pivot 4  Minimal assistance   Additional items Assist x 1;Bedrails;Armrests; Increased time required;Verbal cues   Toilet transfer Unable to assess   Additional Comments cues for safe hand placement and footing required      Functional Mobility Functional Mobility 4  Minimal assistance   Additional Comments x1   Additional items Rolling walker   Cognition   Overall Cognitive Status Impaired   Arousal/Participation Alert; Responsive; Cooperative   Attention Within functional limits   Orientation Level Oriented X4   Memory Decreased short term memory;Decreased recall of precautions   Following Commands Follows multistep commands with increased time or repetition   Comments Pt able to folow commands and make her needs known this tx session  Additional Activities   Additional Activities Other (Comment)  (reviewed current plan of care  )   Additional Activities Comments Pt reports having good understanding  Activity Tolerance   Activity Tolerance Patient limited by fatigue;Patient limited by pain   Medical Staff Made Aware reported all findings to nursing staff  Assessment   Assessment Pt was seen for skilled OT with focus on completion of dynamic stand balance activities, functional reach activities, review of RUE ROM, stress ball exercises, fine motor coordination activities and review of current plan of care  Pt able to tolerate functional stand balance activities with encouragement to reach table top for playing cards to supinate and pronate RUE due to noted edema and limited functional grasp  Extended time required to complete activity with gross functional grasp in RUE  Pt able to supinate cards after picking up with RUE and hold RW with LUE with F quality  See above levels of A required for all functional tasks  Pt required Min A x1 to achieve OOB positioning in bedside chair  Slow controlled movements noted with activities  Reported increased edema and swelling of the inner forearm at IV site to RN  Pt able to tolerate functional tasks with use of RUE without further increase in pain levels  The patient's raw score on the AM-PAC Daily Activity inpatient short form is 17, standardized score is 37 26, less than 39 4   Patients at this level are likely to benefit from discharge to post-acute rehabilitation services  Plan   Treatment Interventions ADL retraining; Endurance training;UE strengthening/ROM; Functional transfer training;Cognitive reorientation   Goal Expiration Date 07/29/21   OT Treatment Day 1   OT Frequency 3-5x/wk   Recommendation   OT Discharge Recommendation Post acute rehabilitation services   AM-PAC Daily Activity Inpatient   Lower Body Dressing 2   Bathing 2   Toileting 2   Upper Body Dressing 3   Grooming 4   Eating 4   Daily Activity Raw Score 17   Daily Activity Standardized Score (Calc for Raw Score >=11) 37 26   AM-PAC Applied Cognition Inpatient   Following a Speech/Presentation 3   Understanding Ordinary Conversation 3   Taking Medications 3   Remembering Where Things Are Placed or Put Away 3   Remembering List of 4-5 Errands 2   Taking Care of Complicated Tasks 2   Applied Cognition Raw Score 16   Applied Cognition Standardized Score 35 03   Anshu May, [address]

## 2021-07-16 NOTE — ASSESSMENT & PLAN NOTE
Lab Results   Component Value Date    HGBA1C 4 7 07/11/2021       Recent Labs     07/15/21  1123 07/15/21  1630 07/15/21  2131 07/16/21  0732   POCGLU 86 107 95 90       Blood Sugar Average: Last 72 hrs:  (P) 01 [id number]    Not on medications  Diabetic diet  Sliding scale

## 2021-07-16 NOTE — PLAN OF CARE
Problem: PHYSICAL THERAPY ADULT  Goal: Performs mobility at highest level of function for planned discharge setting  See evaluation for individualized goals  Description: Treatment/Interventions: Functional transfer training, LE strengthening/ROM, Elevations, Therapeutic exercise, Endurance training, Patient/family training, Bed mobility, Gait training, Spoke to nursing, OT  Equipment Recommended: Berto Berry       See flowsheet documentation for full assessment, interventions and recommendations  Outcome: Progressing  Note: Prognosis: Fair  Problem List: Decreased strength, Decreased endurance, Impaired balance, Decreased mobility, Pain, Decreased skin integrity  Assessment: Pt seen for PT per POC  Improved mobility & activity tolerance noted this tx session  Pt require minAx1 for most functional mobility including amb w/ RW  Pt continue to require cues for overall mobility techniques & safety  Pt tolerated static standing activity approx  2mins w/ CGAx1 for safety  See above levels of assistance required for all functional tasks  Gait deviations as above, slow w/ dec foot clearance & strides but no gross LOB noted  Improved amb tolerance this tx session compared to previous PT session  Pt require seated rest in amb trials 2* to pain & fatigue  Pt tolerated above mentioned thera  ex well, AROM except hip abduction/adduction require AAROM  Pt require prolonged rest periods in between tasks & exercises 2* to pain & fatigue  Noted RUE swelling, RN notified  Nsg staff most recent vital signs as follows: /75   Pulse 78   Temp 98 2 °F (36 8 °C) (Oral)   Resp 18   Wt 85 9 kg (189 lb 6 oz)   SpO2 97%   Will continue PT per POC  At end of session, pt back in bed in stable condition, call bell & phone in reach, all lines intact  Fall precautions reinforced w/ good understanding  The patient's AM-PAC Basic Mobility Inpatient Short Form Raw Score is 17, Standardized Score is 39 67   A standardized score less than 42 9 suggests the patient may benefit from discharge to post-acute rehabilitation services  From PT standpoint, will continue to recommend inpt rehab at D/C  CM to follow  Nsg staff to continue to mobilized pt (OOB in chair for all meals & ambulate in room/unit) as tolerated to prevent decline in function  Nsg notified  Barriers to Discharge: Inaccessible home environment, Decreased caregiver support  Barriers to Discharge Comments: (+) stairs; ?level of support at home     PT Discharge Recommendation: Post acute rehabilitation services     PT - OK to Discharge: Yes (to STR when medically cleared)    See flowsheet documentation for full assessment

## 2021-07-16 NOTE — CASE MANAGEMENT
Therapy recommends STR for pt- Discussed Freedom of Choice with pt preferring [address] [facility] then [facility]  Pt has been accepted to [address] [facility] with pt accepting of bed when she is weaned off IV Pain meds for 24hrs- anticipate d/c Monday with auth to be initiated Sunday in preparation for same  Will continue to follow to assist with finalized d/c poc  Addendum:  Pt fully vaccinated for Covid with card to be scanned into EHR- uploaded to [employer] for [facility] need  Search [facility] performed as pt will need asst with [facility] to facility on d/c- pt is 100% eligible for financial asst w/ transport

## 2021-07-16 NOTE — ASSESSMENT & PLAN NOTE
35year old female presenting with sickle cell crisis  Pain worse today than yesterday  - Continue Pain control / bowel regimen  - Started po for moderate pain will bridge her this weekend  - Titrated pain regimen today  - Discontinued IVF

## 2021-07-17 LAB
ANION GAP SERPL CALCULATED.3IONS-SCNC: 7 MMOL/L (ref 4–13)
BASOPHILS # BLD AUTO: 0.04 THOUSANDS/ΜL (ref 0–0.1)
BASOPHILS NFR BLD AUTO: 1 % (ref 0–1)
BUN SERPL-MCNC: 5 MG/DL (ref 5–25)
CALCIUM SERPL-MCNC: 8.8 MG/DL (ref 8.3–10.1)
CHLORIDE SERPL-SCNC: 101 MMOL/L (ref 100–108)
CO2 SERPL-SCNC: 29 MMOL/L (ref 21–32)
CREAT SERPL-MCNC: 0.71 MG/DL (ref 0.6–1.3)
EOSINOPHIL # BLD AUTO: 0.06 THOUSAND/ΜL (ref 0–0.61)
EOSINOPHIL NFR BLD AUTO: 1 % (ref 0–6)
ERYTHROCYTE [DISTWIDTH] IN BLOOD BY AUTOMATED COUNT: 15.8 % (ref 11.6–15.1)
GFR SERPL CREATININE-BSD FRML MDRD: 121 ML/MIN/1.73SQ M
GLUCOSE SERPL-MCNC: 107 MG/DL (ref 65–140)
GLUCOSE SERPL-MCNC: 84 MG/DL (ref 65–140)
GLUCOSE SERPL-MCNC: 86 MG/DL (ref 65–140)
GLUCOSE SERPL-MCNC: 87 MG/DL (ref 65–140)
GLUCOSE SERPL-MCNC: 91 MG/DL (ref 65–140)
HCT VFR BLD AUTO: 31.7 % (ref 34.8–46.1)
HGB BLD-MCNC: 10.4 G/DL (ref 11.5–15.4)
IMM GRANULOCYTES # BLD AUTO: 0.03 THOUSAND/UL (ref 0–0.2)
IMM GRANULOCYTES NFR BLD AUTO: 1 % (ref 0–2)
LYMPHOCYTES # BLD AUTO: 2 THOUSANDS/ΜL (ref 0.6–4.47)
LYMPHOCYTES NFR BLD AUTO: 41 % (ref 14–44)
MAGNESIUM SERPL-MCNC: 2 MG/DL (ref 1.6–2.6)
MCH RBC QN AUTO: 21.8 PG (ref 26.8–34.3)
MCHC RBC AUTO-ENTMCNC: 32.8 G/DL (ref 31.4–37.4)
MCV RBC AUTO: 67 FL (ref 82–98)
MONOCYTES # BLD AUTO: 0.35 THOUSAND/ΜL (ref 0.17–1.22)
MONOCYTES NFR BLD AUTO: 7 % (ref 4–12)
NEUTROPHILS # BLD AUTO: 2.35 THOUSANDS/ΜL (ref 1.85–7.62)
NEUTS SEG NFR BLD AUTO: 49 % (ref 43–75)
NRBC BLD AUTO-RTO: 0 /100 WBCS
PLATELET # BLD AUTO: 267 THOUSANDS/UL (ref 149–390)
PMV BLD AUTO: 9.2 FL (ref 8.9–12.7)
POTASSIUM SERPL-SCNC: 3.7 MMOL/L (ref 3.5–5.3)
RBC # BLD AUTO: 4.76 MILLION/UL (ref 3.81–5.12)
SODIUM SERPL-SCNC: 137 MMOL/L (ref 136–145)
WBC # BLD AUTO: 4.83 THOUSAND/UL (ref 4.31–10.16)

## 2021-07-17 PROCEDURE — 80048 BASIC METABOLIC PNL TOTAL CA: CPT | Performed by: STUDENT IN AN ORGANIZED HEALTH CARE EDUCATION/TRAINING PROGRAM

## 2021-07-17 PROCEDURE — 99232 SBSQ HOSP IP/OBS MODERATE 35: CPT | Performed by: STUDENT IN AN ORGANIZED HEALTH CARE EDUCATION/TRAINING PROGRAM

## 2021-07-17 PROCEDURE — 85025 COMPLETE CBC W/AUTO DIFF WBC: CPT | Performed by: STUDENT IN AN ORGANIZED HEALTH CARE EDUCATION/TRAINING PROGRAM

## 2021-07-17 PROCEDURE — 83735 ASSAY OF MAGNESIUM: CPT | Performed by: STUDENT IN AN ORGANIZED HEALTH CARE EDUCATION/TRAINING PROGRAM

## 2021-07-17 PROCEDURE — 82948 REAGENT STRIP/BLOOD GLUCOSE: CPT

## 2021-07-17 RX ADMIN — MORPHINE SULFATE 4 MG: 4 INJECTION INTRAVENOUS at 06:02

## 2021-07-17 RX ADMIN — MORPHINE SULFATE 4 MG: 4 INJECTION INTRAVENOUS at 02:18

## 2021-07-17 RX ADMIN — MORPHINE SULFATE 4 MG: 4 INJECTION INTRAVENOUS at 17:38

## 2021-07-17 RX ADMIN — STANDARDIZED SENNA CONCENTRATE 8.6 MG: 8.6 TABLET ORAL at 17:38

## 2021-07-17 RX ADMIN — MORPHINE SULFATE 4 MG: 4 INJECTION INTRAVENOUS at 09:25

## 2021-07-17 RX ADMIN — ENOXAPARIN SODIUM 40 MG: 40 INJECTION SUBCUTANEOUS at 08:33

## 2021-07-17 RX ADMIN — POLYETHYLENE GLYCOL 3350 17 G: 17 POWDER, FOR SOLUTION ORAL at 08:34

## 2021-07-17 RX ADMIN — MORPHINE SULFATE 4 MG: 4 INJECTION INTRAVENOUS at 12:43

## 2021-07-17 RX ADMIN — MORPHINE SULFATE 4 MG: 4 INJECTION INTRAVENOUS at 21:21

## 2021-07-17 RX ADMIN — STANDARDIZED SENNA CONCENTRATE 8.6 MG: 8.6 TABLET ORAL at 08:33

## 2021-07-17 NOTE — PROGRESS NOTES
[facility]  Progress Note - Garry Freeman 1980, 35 y o  female MRN: [id number]  Unit/Bed#: [facility] 87 221-01 Encounter: [id number]  Primary Care Provider: No primary care provider on file  Date and time admitted to hospital: 7/10/2021  6:43 PM    * Sickle cell anemia with pain [facility])  Assessment & Plan  35year old female presenting with sickle cell crisis  Still with significant pain  She continues to require IV pain medications, will switch her IV regimen to breakthrough daren to begin deescalation   - Continue Pain control / bowel regimen  - Continue po for moderate pain (which she has not received due to severe pain)  Sickle cell anemia (HCC)  Assessment & Plan  Stable  Continue monitoring, no indication for transfusion at this time  Recent Labs     07/17/21  1326   HGB 10 4*   MCV 67*   RDW 15 8*         Localized swelling of right upper extremity  Assessment & Plan  Possibly due to IV infiltration  Supportive care  - VAS right upper extremity duplex  - Warm compress as needed, supportive care, elevate    Diet-controlled diabetes mellitus [facility])  Assessment & Plan  Lab Results   Component Value Date    HGBA1C 4 7 07/11/2021       Recent Labs     07/16/21  1626 07/16/21  2111 07/17/21  0752 07/17/21  1055   POCGLU 87 84 84 86       Blood Sugar Average: Last 72 hrs:  (P) 91    Not on medications  Diabetic diet  Sliding scale  VTE Pharmacologic Prophylaxis:   Pharmacologic: Enoxaparin (Lovenox)  Mechanical VTE Prophylaxis in Place: Yes    Patient Centered Rounds: I have performed bedside rounds with nursing staff today  Discussions with Specialists or Other Care Team Provider: Nursing    Education and Discussions with Family / Patient: patient    Time Spent for Care: 30 minutes  More than 50% of total time spent on counseling and coordination of care as described above      Current Length of Stay: 5 day(s)    Current Patient Status: Inpatient   Certification Statement: The patient will continue to require additional inpatient hospital stay due to iv pain regimen    Discharge Plan: active    Code Status: Level 1 - Full Code      Subjective:   Patient seen and examined at bedside  Still with significant pain chest knee and hips  Objective:     Vitals:   Temp (24hrs), Av 5 °F (36 9 °C), Min:98 2 °F (36 8 °C), Max:98 7 °F (37 1 °C)    Temp:  [98 2 °F (36 8 °C)-98 7 °F (37 1 °C)] 98 2 °F (36 8 °C)  HR:  [72-82] 82  Resp:  [18] 18  BP: (114-115)/(71) 114/71  SpO2:  [97 %-99 %] 97 %  There is no height or weight on file to calculate BMI  Input and Output Summary (last 24 hours):     No intake or output data in the 24 hours ending 21 1521    Physical Exam:     Physical Exam  Vitals reviewed  HENT:      Head: Normocephalic  Nose: Nose normal       Mouth/Throat:      Mouth: Mucous membranes are moist    Eyes:      General: No scleral icterus  Cardiovascular:      Rate and Rhythm: Normal rate and regular rhythm  Comments: Chest tenderness to palpation  Pulmonary:      Effort: Pulmonary effort is normal  No respiratory distress  Abdominal:      General: There is no distension  Palpations: Abdomen is soft  Tenderness: There is no abdominal tenderness  Musculoskeletal:      Cervical back: Normal range of motion  Skin:     General: Skin is warm  Neurological:      Mental Status: She is alert  Mental status is at baseline     Psychiatric:         Mood and Affect: Mood normal          Behavior: Behavior normal        Additional Data:     Labs:    Results from last 7 days   Lab Units 21  1326 07/10/21  1913   WBC Thousand/uL 4 83 13 48*   HEMOGLOBIN g/dL 10 4* 11 1*   HEMATOCRIT % 31 7* 33 7*   PLATELETS Thousands/uL 267 251   BANDS PCT %  --  4   NEUTROS PCT % 49  --    LYMPHS PCT % 41  --    LYMPHO PCT %  --  9*   MONOS PCT % 7  --    MONO PCT %  --  8   EOS PCT % 1 0     Results from last 7 days   Lab Units 21  1326 07/14/21  0538   SODIUM mmol/L 137 138   POTASSIUM mmol/L 3 7 3 6   CHLORIDE mmol/L 101 104   CO2 mmol/L 29 28   BUN mg/dL 5 4*   CREATININE mg/dL 0 71 0 67   ANION GAP mmol/L 7 6   CALCIUM mg/dL 8 8 8 4   ALBUMIN g/dL  --  2 9*   TOTAL BILIRUBIN mg/dL  --  0 67   ALK PHOS U/L  --  38*   ALT U/L  --  17   AST U/L  --  19   GLUCOSE RANDOM mg/dL 107 85         Results from last 7 days   Lab Units 07/17/21  1055 07/17/21  0752 07/16/21  2111 07/16/21  1626 07/16/21  0732 07/15/21  2131 07/15/21  1630 07/15/21  1123 07/15/21  0715 07/14/21  2051 07/14/21  1552 07/14/21  1047   POC GLUCOSE mg/dl 86 84 84 87 90 95 107 86 82 97 101 93     Results from last 7 days   Lab Units 07/11/21  0755   HEMOGLOBIN A1C % 4 7     Results from last 7 days   Lab Units 07/13/21  0534 07/12/21  0605   PROCALCITONIN ng/ml 0 05 <0 05           * I Have Reviewed All Lab Data Listed Above  * Additional Pertinent Lab Tests Reviewed:  [facility] Admission Reviewed    Imaging:    Imaging Reports Reviewed Today Include: no new imaging    Recent Cultures (last 7 days):           Last 24 Hours Medication List:   Current Facility-Administered Medications   Medication Dose Route Frequency Provider Last Rate    acetaminophen  650 mg Oral Q6H PRN Nkechi Espinal MD      enoxaparin  40 mg Subcutaneous Q24H [address] Natty Peng MD      insulin lispro  1-5 Units Subcutaneous TID AC Nkechi Espinal MD      insulin lispro  1-5 Units Subcutaneous HS Nkechi Espinal MD      morphine injection  4 mg Intravenous Q3H PRN Nkechi Espinal MD      morphine injection  2 mg Intravenous Q4H PRN Natty Peng MD      ondansetron  4 mg Intravenous Q6H PRN Nkechi Espinal MD      oxyCODONE-acetaminophen  1 tablet Oral Q4H PRN Natty Peng MD      polyethylene glycol  17 g Oral Daily Natty Peng MD      senna  1 tablet Oral BID Natty Peng MD          Today, Patient Was Seen By: Master Rosa MD    ** Please Note: Dictation voice to text software may have been used in the creation of this document   **

## 2021-07-17 NOTE — ASSESSMENT & PLAN NOTE
35year old female presenting with sickle cell crisis  Still with significant pain  She continues to require IV pain medications, will switch her IV regimen to breakthrough daren to begin deescalation   - Continue Pain control / bowel regimen  - Continue po for moderate pain (which she has not received due to severe pain)

## 2021-07-17 NOTE — PLAN OF CARE
Problem: Potential for Falls  Goal: Patient will remain free of falls  Description: INTERVENTIONS:  - Educate patient/family on patient safety including physical limitations  - Instruct patient to call for assistance with activity   - Consult OT/PT to assist with strengthening/mobility   - Keep Call bell within reach  - Keep bed low and locked with side rails adjusted as appropriate  - Keep care items and personal belongings within reach  - Initiate and maintain comfort rounds  - Make Fall Risk Sign visible to staff  - Apply yellow socks and bracelet for high fall risk patients  - Consider moving patient to room near nurses station  Outcome: Progressing     Problem: PAIN - ADULT  Goal: Verbalizes/displays adequate comfort level or baseline comfort level  Description: Interventions:  - Encourage patient to monitor pain and request assistance  - Assess pain using appropriate pain scale  - Administer analgesics based on type and severity of pain and evaluate response  - Implement non-pharmacological measures as appropriate and evaluate response  - Consider cultural and social influences on pain and pain management  - Notify physician/advanced practitioner if interventions unsuccessful or patient reports new pain  Outcome: Progressing     Problem: INFECTION - ADULT  Goal: Absence or prevention of progression during hospitalization  Description: INTERVENTIONS:  - Assess and monitor for signs and symptoms of infection  - Monitor lab/diagnostic results  - Monitor all insertion sites, i e  indwelling lines, tubes, and drains  - Monitor endotracheal if appropriate and nasal secretions for changes in amount and color  - [place] appropriate cooling/warming therapies per order  - Administer medications as ordered  - Instruct and encourage patient and family to use good hand hygiene technique  - Identify and instruct in appropriate isolation precautions for identified infection/condition  Outcome: Progressing  Goal: Absence of fever/infection during neutropenic period  Description: INTERVENTIONS:  - Monitor WBC    Outcome: Progressing     Problem: SAFETY ADULT  Goal: Maintain or return to baseline ADL function  Description: INTERVENTIONS:  -  Assess patient's ability to carry out ADLs; assess patient's baseline for ADL function and identify physical deficits which impact ability to perform ADLs (bathing, care of mouth/teeth, toileting, grooming, dressing, etc )  - Assess/evaluate cause of self-care deficits   - Assess range of motion  - Assess patient's mobility; develop plan if impaired  - Assess patient's need for assistive devices and provide as appropriate  - Encourage maximum independence but intervene and supervise when necessary  - Involve family in performance of ADLs  - Assess for home care needs following discharge   - Consider OT consult to assist with ADL evaluation and planning for discharge  - Provide patient education as appropriate  Outcome: Progressing  Goal: Maintains/Returns to pre admission functional level  Description: INTERVENTIONS:  - Perform BMAT or MOVE assessment daily    - Set and communicate daily mobility goal to care team and patient/family/caregiver     - Collaborate with rehabilitation services on mobility goals if consulted  - Out of bed for toileting  - Record patient progress and toleration of activity level   Outcome: Progressing     Problem: DISCHARGE PLANNING  Goal: Discharge to home or other facility with appropriate resources  Description: INTERVENTIONS:  - Identify barriers to discharge w/patient and caregiver  - Arrange for needed discharge resources and transportation as appropriate  - Identify discharge learning needs (meds, wound care, etc )  - Arrange for interpretive services to assist at discharge as needed  - Refer to Case Management Department for coordinating discharge planning if the patient needs post-hospital services based on physician/advanced practitioner order or complex needs related to functional status, cognitive ability, or social support system  Outcome: Progressing     Problem: Knowledge Deficit  Goal: Patient/family/caregiver demonstrates understanding of disease process, treatment plan, medications, and discharge instructions  Description: Complete learning assessment and assess knowledge base    Interventions:  - Provide teaching at level of understanding  - Provide teaching via preferred learning methods  Outcome: Progressing     Problem: HEMATOLOGIC - ADULT  Goal: Maintains hematologic stability  Description: INTERVENTIONS  - Assess for signs and symptoms of bleeding or hemorrhage  - Monitor labs  - Administer supportive blood products/factors as ordered and appropriate  Outcome: Progressing     Problem: METABOLIC, FLUID AND ELECTROLYTES - ADULT  Goal: Electrolytes maintained within normal limits  Description: INTERVENTIONS:  - Monitor labs and assess patient for signs and symptoms of electrolyte imbalances  - Administer electrolyte replacement as ordered  - Monitor response to electrolyte replacements, including repeat lab results as appropriate  - Instruct patient on fluid and nutrition as appropriate  Outcome: Progressing

## 2021-07-17 NOTE — PLAN OF CARE
Problem: Potential for Falls  Goal: Patient will remain free of falls  Description: INTERVENTIONS:  - Educate patient/family on patient safety including physical limitations  - Instruct patient to call for assistance with activity   - Consult OT/PT to assist with strengthening/mobility   - Keep Call bell within reach  - Keep bed low and locked with side rails adjusted as appropriate  - Keep care items and personal belongings within reach  - Initiate and maintain comfort rounds  - Make Fall Risk Sign visible to staff  - Offer Toileting every 2 Hours, in advance of need  - Apply yellow socks and bracelet for high fall risk patients  - Consider moving patient to room near nurses station  Outcome: Progressing     Problem: PAIN - ADULT  Goal: Verbalizes/displays adequate comfort level or baseline comfort level  Description: Interventions:  - Encourage patient to monitor pain and request assistance  - Assess pain using appropriate pain scale  - Administer analgesics based on type and severity of pain and evaluate response  - Implement non-pharmacological measures as appropriate and evaluate response  - Consider cultural and social influences on pain and pain management  - Notify physician/advanced practitioner if interventions unsuccessful or patient reports new pain  Outcome: Progressing     Problem: INFECTION - ADULT  Goal: Absence or prevention of progression during hospitalization  Description: INTERVENTIONS:  - Assess and monitor for signs and symptoms of infection  - Monitor lab/diagnostic results  - Monitor all insertion sites, i e  indwelling lines, tubes, and drains  - Monitor endotracheal if appropriate and nasal secretions for changes in amount and color  - [place] appropriate cooling/warming therapies per order  - Administer medications as ordered  - Instruct and encourage patient and family to use good hand hygiene technique  - Identify and instruct in appropriate isolation precautions for identified infection/condition  Outcome: Progressing     Problem: SAFETY ADULT  Goal: Maintain or return to baseline ADL function  Description: INTERVENTIONS:  -  Assess patient's ability to carry out ADLs; assess patient's baseline for ADL function and identify physical deficits which impact ability to perform ADLs (bathing, care of mouth/teeth, toileting, grooming, dressing, etc )  - Assess/evaluate cause of self-care deficits   - Assess range of motion  - Assess patient's mobility; develop plan if impaired  - Assess patient's need for assistive devices and provide as appropriate  - Encourage maximum independence but intervene and supervise when necessary  - Involve family in performance of ADLs  - Assess for home care needs following discharge   - Consider OT consult to assist with ADL evaluation and planning for discharge  - Provide patient education as appropriate  Outcome: Progressing  Goal: Maintains/Returns to pre admission functional level  Description: INTERVENTIONS:  - Perform BMAT or MOVE assessment daily    - Set and communicate daily mobility goal to care team and patient/family/caregiver  - Collaborate with rehabilitation services on mobility goals if consulted  - Perform Range of Motion 4 times a day  - Reposition patient every 2 hours    - Dangle patient 4 times a day  - Stand patient 4 times a day  - Ambulate patient 4 times a day  - Out of bed to chair 4 times a day   - Out of bed for meals 4 times a day  - Out of bed for toileting  - Record patient progress and toleration of activity level   Outcome: Progressing     Problem: DISCHARGE PLANNING  Goal: Discharge to home or other facility with appropriate resources  Description: INTERVENTIONS:  - Identify barriers to discharge w/patient and caregiver  - Arrange for needed discharge resources and transportation as appropriate  - Identify discharge learning needs (meds, wound care, etc )  - Arrange for interpretive services to assist at discharge as needed  - Refer to Case Management Department for coordinating discharge planning if the patient needs post-hospital services based on physician/advanced practitioner order or complex needs related to functional status, cognitive ability, or social support system  Outcome: Progressing     Problem: Knowledge Deficit  Goal: Patient/family/caregiver demonstrates understanding of disease process, treatment plan, medications, and discharge instructions  Description: Complete learning assessment and assess knowledge base    Interventions:  - Provide teaching at level of understanding  - Provide teaching via preferred learning methods  Outcome: Progressing     Problem: HEMATOLOGIC - ADULT  Goal: Maintains hematologic stability  Description: INTERVENTIONS  - Assess for signs and symptoms of bleeding or hemorrhage  - Monitor labs  - Administer supportive blood products/factors as ordered and appropriate  Outcome: Progressing     Problem: METABOLIC, FLUID AND ELECTROLYTES - ADULT  Goal: Electrolytes maintained within normal limits  Description: INTERVENTIONS:  - Monitor labs and assess patient for signs and symptoms of electrolyte imbalances  - Administer electrolyte replacement as ordered  - Monitor response to electrolyte replacements, including repeat lab results as appropriate  - Instruct patient on fluid and nutrition as appropriate  Outcome: Progressing     Problem: MOBILITY - ADULT  Goal: Maintain or return to baseline ADL function  Description: INTERVENTIONS:  -  Assess patient's ability to carry out ADLs; assess patient's baseline for ADL function and identify physical deficits which impact ability to perform ADLs (bathing, care of mouth/teeth, toileting, grooming, dressing, etc )  - Assess/evaluate cause of self-care deficits   - Assess range of motion  - Assess patient's mobility; develop plan if impaired  - Assess patient's need for assistive devices and provide as appropriate  - Encourage maximum independence but intervene and supervise when necessary  - Involve family in performance of ADLs  - Assess for home care needs following discharge   - Consider OT consult to assist with ADL evaluation and planning for discharge  - Provide patient education as appropriate  Outcome: Progressing  Goal: Maintains/Returns to pre admission functional level  Description: INTERVENTIONS:  - Perform BMAT or MOVE assessment daily    - Set and communicate daily mobility goal to care team and patient/family/caregiver  - Collaborate with rehabilitation services on mobility goals if consulted  - Perform Range of Motion 4 times a day  - Reposition patient every 2 hours    - Dangle patient 4 times a day  - Stand patient 4 times a day  - Ambulate patient 4 times a day  - Out of bed to chair 4 times a day   - Out of bed for meals 4 times a day  - Out of bed for toileting  - Record patient progress and toleration of activity level   Outcome: Progressing     Problem: INFECTION - ADULT  Goal: Absence of fever/infection during neutropenic period  Description: INTERVENTIONS:  - Monitor WBC    Outcome: Completed

## 2021-07-17 NOTE — ASSESSMENT & PLAN NOTE
Lab Results   Component Value Date    HGBA1C 4 7 07/11/2021       Recent Labs     07/16/21  1626 07/16/21  2111 07/17/21  0752 07/17/21  1055   POCGLU 87 84 84 86       Blood Sugar Average: Last 72 hrs:  (P) 91    Not on medications  Diabetic diet  Sliding scale

## 2021-07-17 NOTE — ASSESSMENT & PLAN NOTE
Stable  Continue monitoring, no indication for transfusion at this time      Recent Labs     07/17/21  1326   HGB 10 4*   MCV 67*   RDW 15 8*

## 2021-07-18 ENCOUNTER — APPOINTMENT (INPATIENT)
Dept: CT IMAGING | Facility: HOSPITAL | Age: 41
DRG: 812 | End: 2021-07-18
Payer: COMMERCIAL

## 2021-07-18 LAB
GLUCOSE SERPL-MCNC: 81 MG/DL (ref 65–140)
GLUCOSE SERPL-MCNC: 88 MG/DL (ref 65–140)

## 2021-07-18 PROCEDURE — 82948 REAGENT STRIP/BLOOD GLUCOSE: CPT

## 2021-07-18 PROCEDURE — 97116 GAIT TRAINING THERAPY: CPT

## 2021-07-18 PROCEDURE — 97110 THERAPEUTIC EXERCISES: CPT

## 2021-07-18 PROCEDURE — G1004 CDSM NDSC: HCPCS

## 2021-07-18 PROCEDURE — 97535 SELF CARE MNGMENT TRAINING: CPT

## 2021-07-18 PROCEDURE — 99232 SBSQ HOSP IP/OBS MODERATE 35: CPT | Performed by: STUDENT IN AN ORGANIZED HEALTH CARE EDUCATION/TRAINING PROGRAM

## 2021-07-18 PROCEDURE — 97530 THERAPEUTIC ACTIVITIES: CPT

## 2021-07-18 PROCEDURE — 74176 CT ABD & PELVIS W/O CONTRAST: CPT

## 2021-07-18 RX ORDER — MORPHINE SULFATE 4 MG/ML
4 INJECTION, SOLUTION INTRAMUSCULAR; INTRAVENOUS
Status: DISCONTINUED | OUTPATIENT
Start: 2021-07-18 | End: 2021-07-19

## 2021-07-18 RX ORDER — OXYCODONE HYDROCHLORIDE AND ACETAMINOPHEN 5; 325 MG/1; MG/1
2 TABLET ORAL EVERY 4 HOURS PRN
Status: DISCONTINUED | OUTPATIENT
Start: 2021-07-18 | End: 2021-07-19

## 2021-07-18 RX ORDER — OXYCODONE HYDROCHLORIDE AND ACETAMINOPHEN 5; 325 MG/1; MG/1
1 TABLET ORAL EVERY 4 HOURS PRN
Status: DISCONTINUED | OUTPATIENT
Start: 2021-07-18 | End: 2021-07-19

## 2021-07-18 RX ADMIN — OXYCODONE HYDROCHLORIDE AND ACETAMINOPHEN 2 TABLET: 5; 325 TABLET ORAL at 19:52

## 2021-07-18 RX ADMIN — ENOXAPARIN SODIUM 40 MG: 40 INJECTION SUBCUTANEOUS at 09:08

## 2021-07-18 RX ADMIN — MORPHINE SULFATE 4 MG: 4 INJECTION INTRAVENOUS at 09:08

## 2021-07-18 RX ADMIN — MORPHINE SULFATE 4 MG: 4 INJECTION INTRAVENOUS at 12:12

## 2021-07-18 RX ADMIN — OXYCODONE HYDROCHLORIDE AND ACETAMINOPHEN 2 TABLET: 5; 325 TABLET ORAL at 15:55

## 2021-07-18 RX ADMIN — STANDARDIZED SENNA CONCENTRATE 8.6 MG: 8.6 TABLET ORAL at 17:32

## 2021-07-18 RX ADMIN — STANDARDIZED SENNA CONCENTRATE 8.6 MG: 8.6 TABLET ORAL at 09:08

## 2021-07-18 RX ADMIN — MORPHINE SULFATE 4 MG: 4 INJECTION INTRAVENOUS at 20:52

## 2021-07-18 RX ADMIN — POLYETHYLENE GLYCOL 3350 17 G: 17 POWDER, FOR SOLUTION ORAL at 09:08

## 2021-07-18 RX ADMIN — MORPHINE SULFATE 4 MG: 4 INJECTION INTRAVENOUS at 04:57

## 2021-07-18 NOTE — PLAN OF CARE
Problem: Potential for Falls  Goal: Patient will remain free of falls  Description: INTERVENTIONS:  - Educate patient/family on patient safety including physical limitations  - Instruct patient to call for assistance with activity   - Consult OT/PT to assist with strengthening/mobility   - Keep Call bell within reach  - Keep bed low and locked with side rails adjusted as appropriate  - Keep care items and personal belongings within reach  - Initiate and maintain comfort rounds  - Make Fall Risk Sign visible to staff  - Apply yellow socks and bracelet for high fall risk patients  - Consider moving patient to room near nurses station  Outcome: Progressing     Problem: PAIN - ADULT  Goal: Verbalizes/displays adequate comfort level or baseline comfort level  Description: Interventions:  - Encourage patient to monitor pain and request assistance  - Assess pain using appropriate pain scale  - Administer analgesics based on type and severity of pain and evaluate response  - Implement non-pharmacological measures as appropriate and evaluate response  - Consider cultural and social influences on pain and pain management  - Notify physician/advanced practitioner if interventions unsuccessful or patient reports new pain  Outcome: Progressing     Problem: INFECTION - ADULT  Goal: Absence or prevention of progression during hospitalization  Description: INTERVENTIONS:  - Assess and monitor for signs and symptoms of infection  - Monitor lab/diagnostic results  - Monitor all insertion sites, i e  indwelling lines, tubes, and drains  - Monitor endotracheal if appropriate and nasal secretions for changes in amount and color  - [place] appropriate cooling/warming therapies per order  - Administer medications as ordered  - Instruct and encourage patient and family to use good hand hygiene technique  - Identify and instruct in appropriate isolation precautions for identified infection/condition  Outcome: Progressing     Problem: SAFETY ADULT  Goal: Maintain or return to baseline ADL function  Description: INTERVENTIONS:  -  Assess patient's ability to carry out ADLs; assess patient's baseline for ADL function and identify physical deficits which impact ability to perform ADLs (bathing, care of mouth/teeth, toileting, grooming, dressing, etc )  - Assess/evaluate cause of self-care deficits   - Assess range of motion  - Assess patient's mobility; develop plan if impaired  - Assess patient's need for assistive devices and provide as appropriate  - Encourage maximum independence but intervene and supervise when necessary  - Involve family in performance of ADLs  - Assess for home care needs following discharge   - Consider OT consult to assist with ADL evaluation and planning for discharge  - Provide patient education as appropriate  Outcome: Progressing  Goal: Maintains/Returns to pre admission functional level  Description: INTERVENTIONS:  - Perform BMAT or MOVE assessment daily    - Set and communicate daily mobility goal to care team and patient/family/caregiver     - Collaborate with rehabilitation services on mobility goals if consulted  - Out of bed for toileting  - Record patient progress and toleration of activity level   Outcome: Progressing     Problem: DISCHARGE PLANNING  Goal: Discharge to home or other facility with appropriate resources  Description: INTERVENTIONS:  - Identify barriers to discharge w/patient and caregiver  - Arrange for needed discharge resources and transportation as appropriate  - Identify discharge learning needs (meds, wound care, etc )  - Arrange for interpretive services to assist at discharge as needed  - Refer to Case Management Department for coordinating discharge planning if the patient needs post-hospital services based on physician/advanced practitioner order or complex needs related to functional status, cognitive ability, or social support system  Outcome: Progressing     Problem: Knowledge Deficit  Goal: Patient/family/caregiver demonstrates understanding of disease process, treatment plan, medications, and discharge instructions  Description: Complete learning assessment and assess knowledge base    Interventions:  - Provide teaching at level of understanding  - Provide teaching via preferred learning methods  Outcome: Progressing     Problem: HEMATOLOGIC - ADULT  Goal: Maintains hematologic stability  Description: INTERVENTIONS  - Assess for signs and symptoms of bleeding or hemorrhage  - Monitor labs  - Administer supportive blood products/factors as ordered and appropriate  Outcome: Progressing     Problem: METABOLIC, FLUID AND ELECTROLYTES - ADULT  Goal: Electrolytes maintained within normal limits  Description: INTERVENTIONS:  - Monitor labs and assess patient for signs and symptoms of electrolyte imbalances  - Administer electrolyte replacement as ordered  - Monitor response to electrolyte replacements, including repeat lab results as appropriate  - Instruct patient on fluid and nutrition as appropriate  Outcome: Progressing     Problem: MOBILITY - ADULT  Goal: Maintain or return to baseline ADL function  Description: INTERVENTIONS:  -  Assess patient's ability to carry out ADLs; assess patient's baseline for ADL function and identify physical deficits which impact ability to perform ADLs (bathing, care of mouth/teeth, toileting, grooming, dressing, etc )  - Assess/evaluate cause of self-care deficits   - Assess range of motion  - Assess patient's mobility; develop plan if impaired  - Assess patient's need for assistive devices and provide as appropriate  - Encourage maximum independence but intervene and supervise when necessary  - Involve family in performance of ADLs  - Assess for home care needs following discharge   - Consider OT consult to assist with ADL evaluation and planning for discharge  - Provide patient education as appropriate  Outcome: Progressing  Goal: Maintains/Returns to pre admission functional level  Description: INTERVENTIONS:  - Perform BMAT or MOVE assessment daily    - Set and communicate daily mobility goal to care team and patient/family/caregiver     - Collaborate with rehabilitation services on mobility goals if consulted  - Out of bed for toileting  - Record patient progress and toleration of activity level   Outcome: Progressing

## 2021-07-18 NOTE — OCCUPATIONAL THERAPY NOTE
OccupationalTherapy Progress Note     Patient Name: Heidi Wayne  Today's Date: 7/18/2021  Problem List  Principal Problem:    Sickle cell anemia with pain [facility])  Active Problems:    Diet-controlled diabetes mellitus ([address] )    Sickle cell anemia ([facility])    Localized swelling of right upper extremity          07/18/21 1139   OT Last Visit   OT Visit Date 07/18/21   Note Type   Note Type Treatment   Restrictions/Precautions   Weight Bearing Precautions Per Order No   Other Precautions Cognitive; Chair Alarm; Bed Alarm;Multiple lines;Telemetry; Fall Risk;Pain   General   Response to Previous Treatment Patient with no complaints from previous session   Lifestyle   Autonomy PTA pt states independence with all aspects of her ADLs, transfers, ambulation--w/o device; neg , neg home alone, neg falls   Reciprocal Relationships limited family support   Service to Others worked for [employer] in [address] [place] [address] watching TV   Pain Assessment   Pain Assessment Tool 0-10   Pain Score 9   Pain Location/Orientation Location: Generalized   Hospital Pain Intervention(s) Repositioned; Ambulation/increased activity; Emotional support; Rest   Multiple Pain Sites No   ADL   Where Assessed Chair   Grooming Assistance 5  Supervision/Setup   Grooming Deficit Setup;Supervision/safety; Increased time to complete   UB Dressing Assistance 4  Minimal Assistance   UB Dressing Deficit Setup;Verbal cueing;Supervision/safety; Increased time to complete;Pull around back;Pull down in back   LB Dressing Assistance 3  Moderate Assistance   LB Dressing Deficit Setup;Verbal cueing;Supervision/safety; Increased time to complete; Don/doff R sock; Don/doff L sock   Functional Standing Tolerance   Time 3-4 mins   Activity Dynamic standing balance activity   Comments Min A for balance/steadying   Bed Mobility   Supine to Sit 4  Minimal assistance   Additional items Assist x 1;HOB elevated; Bedrails; Increased time required;Verbal cues;LE management   Sit to Supine Unable to assess   Additional Comments Pt seated OOB in chair at end of session with call bell and phone within reach  Chair alarm activated  All needs met and pt reports no further questions for OT at this time  Transfers   Sit to Stand 4  Minimal assistance   Additional items Assist x 1; Armrests; Increased time required;Verbal cues   Stand to Sit 4  Minimal assistance   Additional items Assist x 1; Armrests; Increased time required;Verbal cues   Additional Comments Cues for safe technique and hand placement   Functional Mobility   Functional Mobility 4  Minimal assistance   Additional Comments Assist x1   Additional items Rolling walker   Therapeutic Exercise - ROM   UE-ROM Yes   ROM- Right Upper Extremities   R Shoulder AROM;AAROM; Flexion; Extension;Horizontal ABduction   R Elbow AROM;AAROM;Elbow flexion;Elbow extension   R Wrist AROM   R Hand AROM   R Position Seated   R Weight/Reps/Sets 10 reps x1   ROM - Left Upper Extremities    L Shoulder AROM;AAROM; Flexion; Extension;Horizontal ABduction   L Elbow AROM;AAROM;Elbow flexion;Elbow extension   L Wrist AROM; Wrist flexion;Wrist extension   L Hand AROM   L Position Seated   L Weight/Reps/Sets 10 reps x1   Cognition   Overall Cognitive Status Impaired   Arousal/Participation Alert; Cooperative   Attention Attends with cues to redirect   Orientation Level Oriented X4   Memory Decreased recall of precautions   Following Commands Follows one step commands without difficulty   Activity Tolerance   Activity Tolerance Patient limited by pain   Medical Staff Made Aware Maya RN; Reyna Clemens PTA   Assessment   Assessment Pt seen for OT treatment session focusing on functional activity tolerance, bed mobility, ADLs, functional mobility/transfers, and UE ROM/strengthening  Pt alert and cooperative throughout session  Pt lying supine at start of session, completing bed mobility (supine>sit) w/ Min A with assist for LE management   Transfers (sit<>stand) completed w/ Min A with assist required to initiate forward weight shift from surface for sit>stand and assist for controlled descent to surface for stand>sit  Min A required for functional mobility w/ assist for balance/steadying and cues for safe use of RW  ADL tasks completed while seated OOB in chair  UB dressing completed w/ Min A with assist to pull gown over shldrs/down in back  LB dressing completed w/ Mod A  Pt able to doff B/L socks w/ increased time/effort, however required assist to don B/L socks 2* impaired functional reach  UE exercises completed to increase UE ROM/strength needed for ADLs and transfers  UE exercises completed w/ AROM/AAROM with AAROM required w/ increased repetitions 2* increased fatigue demonstrated by pt  Pt seated OOB in chair at end of session with call bell and phone within reach  Chair alarm activated  All needs met and pt reports no further questions for OT at this time  The patient's raw score on the AM-PAC Daily Activity inpatient short form is 16, standardized score is 35 96, less than 39 4  Patients at this level are likely to benefit from discharge to post-acute rehabilitation services  Please refer to the recommendation of the Occupational Therapist for safe discharge planning  Continue to recommend STR when medically cleared  OT to follow pt on caseload  Plan   Treatment Interventions ADL retraining;Functional transfer training;UE strengthening/ROM; Endurance training;Patient/family training;Equipment evaluation/education; Compensatory technique education; Energy conservation; Activityengagement   Goal Expiration Date 07/29/21   OT Treatment Day 2   OT Frequency 3-5x/wk   Recommendation   OT Discharge Recommendation Post acute rehabilitation services   OT - OK to Discharge Yes  (when medically cleared to rehab)   [facility] Daily Activity Inpatient   Lower Body Dressing 2   Bathing 2   Toileting 2   Upper Body Dressing 3   Grooming 3   Eating 4   Daily Activity Raw Score 16 Daily Activity Standardized Score (Calc for Raw Score >=11) 35 96   AM-PAC Applied Cognition Inpatient   Following a Speech/Presentation 3   Understanding Ordinary Conversation 3   Taking Medications 3   Remembering Where Things Are Placed or Put Away 3   Remembering List of 4-5 Errands 2   Taking Care of Complicated Tasks 2   Applied Cognition Raw Score 16   Applied Cognition Standardized Score 35 03          Jonathon Raw, OTR/L

## 2021-07-18 NOTE — PLAN OF CARE
Problem: PHYSICAL THERAPY ADULT  Goal: Performs mobility at highest level of function for planned discharge setting  See evaluation for individualized goals  Description:   Outcome: Progressing  Note: Prognosis: Fair  Problem List: Decreased strength, Decreased range of motion, Impaired balance, Decreased endurance, Decreased mobility, Decreased safety awareness, Obesity, Decreased skin integrity, Pain  Assessment: Pt  supine in bed upon my arrival  Pt  reporting fatigue/pain, however agreeable to therapeutic intervention  Performance of HEP with cues provided for proper completion  Progressed with transfers requiring A of therapist with cues for hand placement/technique  Pt  progressed with an amb  trial with A of therapist with cues provided for LE sequencing  Pt  limited by fatigue/pain, requiring quick positioning in bedside chair  Pt  remained seated in bedside chair with alarm active at end of treatment session  PT will continue to recommend d/c to rehab when medically stable for continued improvement of noted impairments  Barriers to Discharge: Inaccessible home environment, Decreased caregiver support  Barriers to Discharge Comments: ELIZABETH, level of support at home  PT Discharge Recommendation: Post acute rehabilitation services     PT - OK to Discharge: Yes (if d/c to rehab when medically stable )    See flowsheet documentation for full assessment

## 2021-07-18 NOTE — PHYSICAL THERAPY NOTE
Physical Therapy Progress Note     07/18/21 1138   PT Last Visit   PT Visit Date 07/18/21   Note Type   Note Type Treatment   Pain Assessment   Pain Assessment Tool 0-10   Pain Score 9   Pain Location/Orientation Location: Generalized   Hospital Pain Intervention(s) Ambulation/increased activity;Repositioned   Restrictions/Precautions   Weight Bearing Precautions Per Order No   Other Precautions Multiple lines; Fall Risk;Pain;Telemetry; Chair Alarm; Bed Alarm   General   Chart Reviewed Yes   Response to Previous Treatment Patient reporting fatigue but able to participate  Family/Caregiver Present No   Subjective   Subjective Willing to participate in therapy this AM    Bed Mobility   Supine to Sit 4  Minimal assistance   Additional items Assist x 1;HOB elevated; Bedrails;Leg ; Increased time required;Verbal cues;LE management   Additional Comments Pt  remained seated in bedside chair at end of treatment session  Transfers   Sit to Stand 4  Minimal assistance   Additional items Assist x 1; Armrests; Increased time required;Verbal cues; Bedrails   Stand to Sit 4  Minimal assistance   Additional items Assist x 1;Bedrails; Increased time required;Verbal cues   Ambulation/Elevation   Gait pattern Decreased foot clearance; Forward Flexion; Improper Weight shift; Short stride; Step to;Excessively slow; Inconsistent kiki; Shuffling   Gait Assistance 4  Minimal assist   Additional items Assist x 1;Verbal cues; Tactile cues   Assistive Device Rolling walker   Distance 10'   Balance   Static Sitting Good   Dynamic Sitting Fair +   Static Standing Fair -   Dynamic Standing Poor +   Ambulatory Poor +   Endurance Deficit   Endurance Deficit Yes   Endurance Deficit Description pain/fatigue/weakness   Activity Tolerance   Activity Tolerance Patient limited by fatigue;Patient limited by pain   Medical Staff [address] 79    Nurse Made Aware Yes   Exercises   TKR Sitting;10 reps;AAROM; Bilateral   Assessment   Prognosis Fair Problem List Decreased strength;Decreased range of motion; Impaired balance;Decreased endurance;Decreased mobility; Decreased safety awareness; Obesity; Decreased skin integrity;Pain   Assessment Pt  supine in bed upon my arrival  Pt  reporting fatigue/pain, however agreeable to therapeutic intervention  Performance of HEP with cues provided for proper completion  Progressed with transfers requiring A of therapist with cues for hand placement/technique  Pt  progressed with an amb  trial with A of therapist with cues provided for LE sequencing  Pt  limited by fatigue/pain, requiring quick positioning in bedside chair  Pt  remained seated in bedside chair with alarm active at end of treatment session  PT will continue to recommend d/c to rehab when medically stable for continued improvement of noted impairments  Barriers to Discharge Inaccessible home environment;Decreased caregiver support   Barriers to Discharge Comments ELIZABETH, level of support at home  Goals   Patient Goals To get better  STG Expiration Date 07/25/21   PT Treatment Day 2   Plan   Treatment/Interventions LE strengthening/ROM; Functional transfer training; Endurance training; Therapeutic exercise; Bed mobility;Gait training;Spoke to nursing;Spoke to case management;OT   Progress Progressing toward goals   PT Frequency Other (Comment)  (3-5x/wk)   Recommendation   PT Discharge Recommendation Post acute rehabilitation services   Equipment Recommended [facility] Recommended Wheeled walker   Change/add to Book[employer]? No   PT - OK to Discharge Yes  (if d/c to rehab when medically stable  )   AM-PAC Basic Mobility Inpatient   Turning in Bed Without Bedrails 3   Lying on Back to Sitting on Edge of Flat Bed 3   Moving Bed to Chair 3   Standing Up From Chair 3   Walk in Room 3   Climb 3-5 Stairs 2   Basic Mobility Inpatient Raw Score 17   Basic Mobility Standardized Score 39 67     An AM-PAC Basic Mobility standardized score less than 42 9 suggests the patient may benefit from discharge to post-acute rehab services      Usha Rico, PTA

## 2021-07-18 NOTE — ASSESSMENT & PLAN NOTE
35year old female presenting with sickle cell crisis  Pain slightly improved  Discontinued IV pain medications for moderate/severe and kept it merely for breakthrough  Breathing 100% on room air   - Continue Pain control / bowel regimen  - Continue po for moderate to severe pain    CT A/P ordered due to new LUQ pain

## 2021-07-18 NOTE — PLAN OF CARE
Problem: Potential for Falls  Goal: Patient will remain free of falls  Description: INTERVENTIONS:  - Educate patient/family on patient safety including physical limitations  - Instruct patient to call for assistance with activity   - Consult OT/PT to assist with strengthening/mobility   - Keep Call bell within reach  - Keep bed low and locked with side rails adjusted as appropriate  - Keep care items and personal belongings within reach  - Initiate and maintain comfort rounds  - Make Fall Risk Sign visible to staff  - Offer Toileting every 2 Hours, in advance of need  - Apply yellow socks and bracelet for high fall risk patients  - Consider moving patient to room near nurses station  Outcome: Progressing     Problem: PAIN - ADULT  Goal: Verbalizes/displays adequate comfort level or baseline comfort level  Description: Interventions:  - Encourage patient to monitor pain and request assistance  - Assess pain using appropriate pain scale  - Administer analgesics based on type and severity of pain and evaluate response  - Implement non-pharmacological measures as appropriate and evaluate response  - Consider cultural and social influences on pain and pain management  - Notify physician/advanced practitioner if interventions unsuccessful or patient reports new pain  Outcome: Progressing     Problem: INFECTION - ADULT  Goal: Absence or prevention of progression during hospitalization  Description: INTERVENTIONS:  - Assess and monitor for signs and symptoms of infection  - Monitor lab/diagnostic results  - Monitor all insertion sites, i e  indwelling lines, tubes, and drains  - Monitor endotracheal if appropriate and nasal secretions for changes in amount and color  - [place] appropriate cooling/warming therapies per order  - Administer medications as ordered  - Instruct and encourage patient and family to use good hand hygiene technique  - Identify and instruct in appropriate isolation precautions for identified infection/condition  Outcome: Progressing     Problem: SAFETY ADULT  Goal: Maintain or return to baseline ADL function  Description: INTERVENTIONS:  -  Assess patient's ability to carry out ADLs; assess patient's baseline for ADL function and identify physical deficits which impact ability to perform ADLs (bathing, care of mouth/teeth, toileting, grooming, dressing, etc )  - Assess/evaluate cause of self-care deficits   - Assess range of motion  - Assess patient's mobility; develop plan if impaired  - Assess patient's need for assistive devices and provide as appropriate  - Encourage maximum independence but intervene and supervise when necessary  - Involve family in performance of ADLs  - Assess for home care needs following discharge   - Consider OT consult to assist with ADL evaluation and planning for discharge  - Provide patient education as appropriate  Outcome: Progressing  Goal: Maintains/Returns to pre admission functional level  Description: INTERVENTIONS:  - Perform BMAT or MOVE assessment daily    - Set and communicate daily mobility goal to care team and patient/family/caregiver  - Collaborate with rehabilitation services on mobility goals if consulted  - Perform Range of Motion 4 times a day  - Reposition patient every 2 hours    - Dangle patient 4 times a day  - Stand patient 4 times a day  - Ambulate patient 4 times a day  - Out of bed to chair 4 times a day   - Out of bed for meals 4 times a day  - Out of bed for toileting  - Record patient progress and toleration of activity level   Outcome: Progressing     Problem: DISCHARGE PLANNING  Goal: Discharge to home or other facility with appropriate resources  Description: INTERVENTIONS:  - Identify barriers to discharge w/patient and caregiver  - Arrange for needed discharge resources and transportation as appropriate  - Identify discharge learning needs (meds, wound care, etc )  - Arrange for interpretive services to assist at discharge as needed  - Refer to Case Management Department for coordinating discharge planning if the patient needs post-hospital services based on physician/advanced practitioner order or complex needs related to functional status, cognitive ability, or social support system  Outcome: Progressing     Problem: Knowledge Deficit  Goal: Patient/family/caregiver demonstrates understanding of disease process, treatment plan, medications, and discharge instructions  Description: Complete learning assessment and assess knowledge base    Interventions:  - Provide teaching at level of understanding  - Provide teaching via preferred learning methods  Outcome: Progressing     Problem: HEMATOLOGIC - ADULT  Goal: Maintains hematologic stability  Description: INTERVENTIONS  - Assess for signs and symptoms of bleeding or hemorrhage  - Monitor labs  - Administer supportive blood products/factors as ordered and appropriate  Outcome: Progressing     Problem: METABOLIC, FLUID AND ELECTROLYTES - ADULT  Goal: Electrolytes maintained within normal limits  Description: INTERVENTIONS:  - Monitor labs and assess patient for signs and symptoms of electrolyte imbalances  - Administer electrolyte replacement as ordered  - Monitor response to electrolyte replacements, including repeat lab results as appropriate  - Instruct patient on fluid and nutrition as appropriate  Outcome: Progressing     Problem: MOBILITY - ADULT  Goal: Maintain or return to baseline ADL function  Description: INTERVENTIONS:  -  Assess patient's ability to carry out ADLs; assess patient's baseline for ADL function and identify physical deficits which impact ability to perform ADLs (bathing, care of mouth/teeth, toileting, grooming, dressing, etc )  - Assess/evaluate cause of self-care deficits   - Assess range of motion  - Assess patient's mobility; develop plan if impaired  - Assess patient's need for assistive devices and provide as appropriate  - Encourage maximum independence but intervene and supervise when necessary  - Involve family in performance of ADLs  - Assess for home care needs following discharge   - Consider OT consult to assist with ADL evaluation and planning for discharge  - Provide patient education as appropriate  Outcome: Progressing  Goal: Maintains/Returns to pre admission functional level  Description: INTERVENTIONS:  - Perform BMAT or MOVE assessment daily    - Set and communicate daily mobility goal to care team and patient/family/caregiver  - Collaborate with rehabilitation services on mobility goals if consulted  - Perform Range of Motion 2 times a day  - Reposition patient every 4 hours    - Dangle patient 4 times a day  - Stand patient 4 times a day  - Ambulate patient 4 times a day  - Out of bed to chair 4 times a day   - Out of bed for meals 4 times a day  - Out of bed for toileting  - Record patient progress and toleration of activity level   Outcome: Progressing

## 2021-07-18 NOTE — PROGRESS NOTES
[facility]  Progress Note - Matilda Machado 1980, 35 y o  female MRN: [id number]  Unit/Bed#: [facility] 87 221-01 Encounter: [id number]  Primary Care Provider: No primary care provider on file  Date and time admitted to hospital: 7/10/2021  6:43 PM    * Sickle cell anemia with pain [facility])  Assessment & Plan  35year old female presenting with sickle cell crisis  Pain slightly improved  Discontinued IV pain medications for moderate/severe and kept it merely for breakthrough  Breathing 100% on room air   - Continue Pain control / bowel regimen  - Continue po for moderate to severe pain    CT A/P ordered due to new LUQ pain  Sickle cell anemia (HCC)  Assessment & Plan  Stable  Continue monitoring, no indication for transfusion at this time  Recent Labs     07/17/21  1326   HGB 10 4*   MCV 67*   RDW 15 8*         Localized swelling of right upper extremity  Assessment & Plan  Possibly due to IV infiltration  Supportive care  - VAS right upper extremity duplex  - Warm compress as needed, supportive care, elevate    Diet-controlled diabetes mellitus [facility])  Assessment & Plan  Lab Results   Component Value Date    HGBA1C 4 7 07/11/2021       Recent Labs     07/17/21  1653 07/17/21  2048 07/18/21  0746 07/18/21  1130   POCGLU 91 87 81 88       Blood Sugar Average: Last 72 hrs:  (P) 64 [id number]    Not on medications  Diabetic diet  Sliding scale discontinued due to normoglycemia  VTE Pharmacologic Prophylaxis:   Pharmacologic: Enoxaparin (Lovenox)  Mechanical VTE Prophylaxis in Place: Yes    Patient Centered Rounds: I have performed bedside rounds with nursing staff today  Discussions with Specialists or Other Care Team Provider: nursing    Education and Discussions with Family / Patient: patient    Time Spent for Care: 30 minutes  More than 50% of total time spent on counseling and coordination of care as described above      Current Length of Stay: 6 day(s)    Current Patient Status: Inpatient   Certification Statement: The patient will continue to require additional inpatient hospital stay due to pain control    Discharge Plan: active    Code Status: Level 1 - Full Code      Subjective:   Patient seen and examined at bedside  Still with pain, now with new LLQ pain  Pain much better however compared to her admission  Objective:     Vitals:   Temp (24hrs), Av 4 °F (36 9 °C), Min:98 °F (36 7 °C), Max:98 6 °F (37 °C)    Temp:  [98 °F (36 7 °C)-98 6 °F (37 °C)] 98 °F (36 7 °C)  HR:  [70-81] 70  Resp:  [18] 18  BP: (104-109)/(73-76) 109/73  SpO2:  [99 %-100 %] 100 %  There is no height or weight on file to calculate BMI  Input and Output Summary (last 24 hours):     No intake or output data in the 24 hours ending 21 1533    Physical Exam:     Physical Exam  Vitals reviewed  HENT:      Head: Normocephalic  Nose: Nose normal       Mouth/Throat:      Mouth: Mucous membranes are moist    Eyes:      General: No scleral icterus  Cardiovascular:      Rate and Rhythm: Normal rate and regular rhythm  Comments: Chest tenderness  Pulmonary:      Effort: Pulmonary effort is normal  No respiratory distress  Breath sounds: No wheezing or rales  Abdominal:      General: Bowel sounds are normal  There is no distension  Palpations: Abdomen is soft  Tenderness: There is abdominal tenderness  Musculoskeletal:      Right lower leg: No edema  Left lower leg: No edema  Comments: RUQ swelling improved   Skin:     General: Skin is warm  Neurological:      Mental Status: She is alert  Mental status is at baseline     Psychiatric:         Mood and Affect: Mood normal          Behavior: Behavior normal        Additional Data:     Labs:    Results from last 7 days   Lab Units 21  1326   WBC Thousand/uL 4 83   HEMOGLOBIN g/dL 10 4*   HEMATOCRIT % 31 7*   PLATELETS Thousands/uL 267   NEUTROS PCT % 49   LYMPHS PCT % 41   MONOS PCT % 7   EOS PCT % 1 Results from last 7 days   Lab Units 07/17/21  1326 07/14/21  0538   SODIUM mmol/L 137 138   POTASSIUM mmol/L 3 7 3 6   CHLORIDE mmol/L 101 104   CO2 mmol/L 29 28   BUN mg/dL 5 4*   CREATININE mg/dL 0 71 0 67   ANION GAP mmol/L 7 6   CALCIUM mg/dL 8 8 8 4   ALBUMIN g/dL  --  2 9*   TOTAL BILIRUBIN mg/dL  --  0 67   ALK PHOS U/L  --  38*   ALT U/L  --  17   AST U/L  --  19   GLUCOSE RANDOM mg/dL 107 85         Results from last 7 days   Lab Units 07/18/21  1130 07/18/21  0746 07/17/21  2048 07/17/21  1653 07/17/21  1055 07/17/21  0752 07/16/21  2111 07/16/21  1626 07/16/21  0732 07/15/21  2131 07/15/21  1630 07/15/21  1123   POC GLUCOSE mg/dl 88 81 87 91 86 84 84 87 90 95 107 86         Results from last 7 days   Lab Units 07/13/21  0534 07/12/21  0605   PROCALCITONIN ng/ml 0 05 <0 05           * I Have Reviewed All Lab Data Listed Above  * Additional Pertinent Lab Tests Reviewed:  Rishi 64 Admission Reviewed    Imaging:    Imaging Reports Reviewed Today Include: no new imaging    Recent Cultures (last 7 days):           Last 24 Hours Medication List:   Current Facility-Administered Medications   Medication Dose Route Frequency Provider Last Rate    acetaminophen  650 mg Oral Q6H PRN Estefanía Bond MD      enoxaparin  40 mg Subcutaneous Q24H [address] Robby Ellsworth MD      insulin lispro  1-5 Units Subcutaneous TID AC Estefanía Bond MD      insulin lispro  1-5 Units Subcutaneous HS Estefanía Bond MD      morphine injection  4 mg Intravenous Q3H PRN Robby Ellsworth MD      ondansetron  4 mg Intravenous Q6H PRN Estefanía Bond MD      oxyCODONE-acetaminophen  1 tablet Oral Q4H PRN Robby Ellsworth MD      Or    oxyCODONE-acetaminophen  2 tablet Oral Q4H PRN Robby Ellsworth MD      polyethylene glycol  17 g Oral Daily Robby Ellsworth MD      senna  1 tablet Oral BID Robby Ellsworth MD          Today, Patient Was Seen By: Bill Mir MD    ** Please Note: Dictation voice to text software may have been used in the creation of this document   **

## 2021-07-18 NOTE — ASSESSMENT & PLAN NOTE
Lab Results   Component Value Date    HGBA1C 4 7 07/11/2021       Recent Labs     07/17/21  1653 07/17/21  2048 07/18/21  0746 07/18/21  1130   POCGLU 91 87 81 88       Blood Sugar Average: Last 72 hrs:  (P) 77 [id number]    Not on medications  Diabetic diet  Sliding scale discontinued due to normoglycemia

## 2021-07-19 ENCOUNTER — APPOINTMENT (INPATIENT)
Dept: NON INVASIVE DIAGNOSTICS | Facility: HOSPITAL | Age: 41
DRG: 812 | End: 2021-07-19
Payer: COMMERCIAL

## 2021-07-19 LAB
ANION GAP SERPL CALCULATED.3IONS-SCNC: 7 MMOL/L (ref 4–13)
BASOPHILS # BLD MANUAL: 0 THOUSAND/UL (ref 0–0.1)
BASOPHILS NFR MAR MANUAL: 0 % (ref 0–1)
BUN SERPL-MCNC: 11 MG/DL (ref 5–25)
CALCIUM SERPL-MCNC: 9.4 MG/DL (ref 8.3–10.1)
CHLORIDE SERPL-SCNC: 103 MMOL/L (ref 100–108)
CO2 SERPL-SCNC: 30 MMOL/L (ref 21–32)
CREAT SERPL-MCNC: 0.87 MG/DL (ref 0.6–1.3)
DACRYOCYTES BLD QL SMEAR: PRESENT
EOSINOPHIL # BLD MANUAL: 0.11 THOUSAND/UL (ref 0–0.4)
EOSINOPHIL NFR BLD MANUAL: 2 % (ref 0–6)
ERYTHROCYTE [DISTWIDTH] IN BLOOD BY AUTOMATED COUNT: 15.9 % (ref 11.6–15.1)
GFR SERPL CREATININE-BSD FRML MDRD: 94 ML/MIN/1.73SQ M
GLUCOSE SERPL-MCNC: 74 MG/DL (ref 65–140)
HCT VFR BLD AUTO: 32.3 % (ref 34.8–46.1)
HGB BLD-MCNC: 10.6 G/DL (ref 11.5–15.4)
LDH SERPL-CCNC: 267 U/L (ref 81–234)
LYMPHOCYTES # BLD AUTO: 2.37 THOUSAND/UL (ref 0.6–4.47)
LYMPHOCYTES # BLD AUTO: 43 % (ref 14–44)
MAGNESIUM SERPL-MCNC: 2 MG/DL (ref 1.6–2.6)
MCH RBC QN AUTO: 22 PG (ref 26.8–34.3)
MCHC RBC AUTO-ENTMCNC: 32.8 G/DL (ref 31.4–37.4)
MCV RBC AUTO: 67 FL (ref 82–98)
MONOCYTES # BLD AUTO: 0.06 THOUSAND/UL (ref 0–1.22)
MONOCYTES NFR BLD: 1 % (ref 4–12)
NEUTROPHILS # BLD MANUAL: 2.98 THOUSAND/UL (ref 1.85–7.62)
NEUTS SEG NFR BLD AUTO: 54 % (ref 43–75)
NRBC BLD AUTO-RTO: 0 /100 WBCS
PLATELET # BLD AUTO: 335 THOUSANDS/UL (ref 149–390)
PLATELET BLD QL SMEAR: ADEQUATE
PMV BLD AUTO: 9.3 FL (ref 8.9–12.7)
POTASSIUM SERPL-SCNC: 3.7 MMOL/L (ref 3.5–5.3)
RBC # BLD AUTO: 4.82 MILLION/UL (ref 3.81–5.12)
RBC MORPH BLD: PRESENT
SODIUM SERPL-SCNC: 140 MMOL/L (ref 136–145)
TOTAL CELLS COUNTED SPEC: 100
WBC # BLD AUTO: 5.52 THOUSAND/UL (ref 4.31–10.16)

## 2021-07-19 PROCEDURE — 97530 THERAPEUTIC ACTIVITIES: CPT

## 2021-07-19 PROCEDURE — 83615 LACTATE (LD) (LDH) ENZYME: CPT | Performed by: STUDENT IN AN ORGANIZED HEALTH CARE EDUCATION/TRAINING PROGRAM

## 2021-07-19 PROCEDURE — 99232 SBSQ HOSP IP/OBS MODERATE 35: CPT | Performed by: INTERNAL MEDICINE

## 2021-07-19 PROCEDURE — 93971 EXTREMITY STUDY: CPT

## 2021-07-19 PROCEDURE — 85027 COMPLETE CBC AUTOMATED: CPT | Performed by: STUDENT IN AN ORGANIZED HEALTH CARE EDUCATION/TRAINING PROGRAM

## 2021-07-19 PROCEDURE — 97110 THERAPEUTIC EXERCISES: CPT

## 2021-07-19 PROCEDURE — 97116 GAIT TRAINING THERAPY: CPT

## 2021-07-19 PROCEDURE — 93971 EXTREMITY STUDY: CPT | Performed by: SURGERY

## 2021-07-19 PROCEDURE — 83735 ASSAY OF MAGNESIUM: CPT | Performed by: STUDENT IN AN ORGANIZED HEALTH CARE EDUCATION/TRAINING PROGRAM

## 2021-07-19 PROCEDURE — 80048 BASIC METABOLIC PNL TOTAL CA: CPT | Performed by: STUDENT IN AN ORGANIZED HEALTH CARE EDUCATION/TRAINING PROGRAM

## 2021-07-19 PROCEDURE — 85007 BL SMEAR W/DIFF WBC COUNT: CPT | Performed by: STUDENT IN AN ORGANIZED HEALTH CARE EDUCATION/TRAINING PROGRAM

## 2021-07-19 RX ORDER — KETOROLAC TROMETHAMINE 30 MG/ML
15 INJECTION, SOLUTION INTRAMUSCULAR; INTRAVENOUS ONCE
Status: COMPLETED | OUTPATIENT
Start: 2021-07-19 | End: 2021-07-19

## 2021-07-19 RX ORDER — OXYCODONE HYDROCHLORIDE 5 MG/1
5 TABLET ORAL EVERY 4 HOURS PRN
Status: DISCONTINUED | OUTPATIENT
Start: 2021-07-19 | End: 2021-07-20 | Stop reason: HOSPADM

## 2021-07-19 RX ORDER — OXYCODONE HYDROCHLORIDE 10 MG/1
10 TABLET ORAL EVERY 4 HOURS PRN
Status: DISCONTINUED | OUTPATIENT
Start: 2021-07-19 | End: 2021-07-20 | Stop reason: HOSPADM

## 2021-07-19 RX ADMIN — STANDARDIZED SENNA CONCENTRATE 8.6 MG: 8.6 TABLET ORAL at 08:18

## 2021-07-19 RX ADMIN — ONDANSETRON 4 MG: 2 INJECTION INTRAMUSCULAR; INTRAVENOUS at 08:19

## 2021-07-19 RX ADMIN — MORPHINE SULFATE 4 MG: 4 INJECTION INTRAVENOUS at 05:28

## 2021-07-19 RX ADMIN — OXYCODONE HYDROCHLORIDE AND ACETAMINOPHEN 2 TABLET: 5; 325 TABLET ORAL at 03:54

## 2021-07-19 RX ADMIN — ACETAMINOPHEN 650 MG: 325 TABLET, FILM COATED ORAL at 11:25

## 2021-07-19 RX ADMIN — OXYCODONE HYDROCHLORIDE 10 MG: 10 TABLET ORAL at 21:58

## 2021-07-19 RX ADMIN — STANDARDIZED SENNA CONCENTRATE 8.6 MG: 8.6 TABLET ORAL at 17:11

## 2021-07-19 RX ADMIN — KETOROLAC TROMETHAMINE 15 MG: 30 INJECTION, SOLUTION INTRAMUSCULAR at 17:52

## 2021-07-19 RX ADMIN — OXYCODONE HYDROCHLORIDE AND ACETAMINOPHEN 2 TABLET: 5; 325 TABLET ORAL at 07:35

## 2021-07-19 RX ADMIN — POLYETHYLENE GLYCOL 3350 17 G: 17 POWDER, FOR SOLUTION ORAL at 08:21

## 2021-07-19 RX ADMIN — ENOXAPARIN SODIUM 40 MG: 40 INJECTION SUBCUTANEOUS at 08:18

## 2021-07-19 RX ADMIN — OXYCODONE HYDROCHLORIDE AND ACETAMINOPHEN 2 TABLET: 5; 325 TABLET ORAL at 17:10

## 2021-07-19 NOTE — PLAN OF CARE
Problem: Potential for Falls  Goal: Patient will remain free of falls  Description: INTERVENTIONS:  - Educate patient/family on patient safety including physical limitations  - Instruct patient to call for assistance with activity   - Consult OT/PT to assist with strengthening/mobility   - Keep Call bell within reach  - Keep bed low and locked with side rails adjusted as appropriate  - Keep care items and personal belongings within reach  - Initiate and maintain comfort rounds  - Make Fall Risk Sign visible to staff  - Apply yellow socks and bracelet for high fall risk patients  - Consider moving patient to room near nurses station  Outcome: Progressing     Problem: PAIN - ADULT  Goal: Verbalizes/displays adequate comfort level or baseline comfort level  Description: Interventions:  - Encourage patient to monitor pain and request assistance  - Assess pain using appropriate pain scale  - Administer analgesics based on type and severity of pain and evaluate response  - Implement non-pharmacological measures as appropriate and evaluate response  - Consider cultural and social influences on pain and pain management  - Notify physician/advanced practitioner if interventions unsuccessful or patient reports new pain  Outcome: Progressing     Problem: INFECTION - ADULT  Goal: Absence or prevention of progression during hospitalization  Description: INTERVENTIONS:  - Assess and monitor for signs and symptoms of infection  - Monitor lab/diagnostic results  - Monitor all insertion sites, i e  indwelling lines, tubes, and drains  - Monitor endotracheal if appropriate and nasal secretions for changes in amount and color  - [place] appropriate cooling/warming therapies per order  - Administer medications as ordered  - Instruct and encourage patient and family to use good hand hygiene technique  - Identify and instruct in appropriate isolation precautions for identified infection/condition  Outcome: Progressing     Problem: SAFETY ADULT  Goal: Maintain or return to baseline ADL function  Description: INTERVENTIONS:  -  Assess patient's ability to carry out ADLs; assess patient's baseline for ADL function and identify physical deficits which impact ability to perform ADLs (bathing, care of mouth/teeth, toileting, grooming, dressing, etc )  - Assess/evaluate cause of self-care deficits   - Assess range of motion  - Assess patient's mobility; develop plan if impaired  - Assess patient's need for assistive devices and provide as appropriate  - Encourage maximum independence but intervene and supervise when necessary  - Involve family in performance of ADLs  - Assess for home care needs following discharge   - Consider OT consult to assist with ADL evaluation and planning for discharge  - Provide patient education as appropriate  Outcome: Progressing  Goal: Maintains/Returns to pre admission functional level  Description: INTERVENTIONS:  - Perform BMAT or MOVE assessment daily    - Set and communicate daily mobility goal to care team and patient/family/caregiver     - Collaborate with rehabilitation services on mobility goals if consulted  - Out of bed for toileting  - Record patient progress and toleration of activity level   Outcome: Progressing     Problem: DISCHARGE PLANNING  Goal: Discharge to home or other facility with appropriate resources  Description: INTERVENTIONS:  - Identify barriers to discharge w/patient and caregiver  - Arrange for needed discharge resources and transportation as appropriate  - Identify discharge learning needs (meds, wound care, etc )  - Arrange for interpretive services to assist at discharge as needed  - Refer to Case Management Department for coordinating discharge planning if the patient needs post-hospital services based on physician/advanced practitioner order or complex needs related to functional status, cognitive ability, or social support system  Outcome: Progressing     Problem: Knowledge Deficit  Goal: Patient/family/caregiver demonstrates understanding of disease process, treatment plan, medications, and discharge instructions  Description: Complete learning assessment and assess knowledge base    Interventions:  - Provide teaching at level of understanding  - Provide teaching via preferred learning methods  Outcome: Progressing     Problem: HEMATOLOGIC - ADULT  Goal: Maintains hematologic stability  Description: INTERVENTIONS  - Assess for signs and symptoms of bleeding or hemorrhage  - Monitor labs  - Administer supportive blood products/factors as ordered and appropriate  Outcome: Progressing     Problem: METABOLIC, FLUID AND ELECTROLYTES - ADULT  Goal: Electrolytes maintained within normal limits  Description: INTERVENTIONS:  - Monitor labs and assess patient for signs and symptoms of electrolyte imbalances  - Administer electrolyte replacement as ordered  - Monitor response to electrolyte replacements, including repeat lab results as appropriate  - Instruct patient on fluid and nutrition as appropriate  Outcome: Progressing     Problem: MOBILITY - ADULT  Goal: Maintain or return to baseline ADL function  Description: INTERVENTIONS:  -  Assess patient's ability to carry out ADLs; assess patient's baseline for ADL function and identify physical deficits which impact ability to perform ADLs (bathing, care of mouth/teeth, toileting, grooming, dressing, etc )  - Assess/evaluate cause of self-care deficits   - Assess range of motion  - Assess patient's mobility; develop plan if impaired  - Assess patient's need for assistive devices and provide as appropriate  - Encourage maximum independence but intervene and supervise when necessary  - Involve family in performance of ADLs  - Assess for home care needs following discharge   - Consider OT consult to assist with ADL evaluation and planning for discharge  - Provide patient education as appropriate  Outcome: Progressing  Goal: Maintains/Returns to pre admission functional level  Description: INTERVENTIONS:  - Perform BMAT or MOVE assessment daily    - Set and communicate daily mobility goal to care team and patient/family/caregiver     - Collaborate with rehabilitation services on mobility goals if consulted  - Out of bed for toileting  - Record patient progress and toleration of activity level   Outcome: Progressing

## 2021-07-19 NOTE — PROGRESS NOTES
[address]  Progress Note - Bruce Gómez 1980, 35 y o  female MRN: [id number]  Unit/Bed#: [place] 68 2 [facility] [place] 87 [id number] Encounter: [id number]  Primary Care Provider: No primary care provider on file  Date and time admitted to hospital: 7/10/2021  6:43 PM    Localized swelling of right upper extremity  Assessment & Plan  Possibly due to IV infiltration  Supportive care  - VAS right upper extremity duplex pending  - Warm compress as needed, supportive care, elevate    Sickle cell anemia (HCC)  Assessment & Plan  Stable  Continue monitoring, no indication for transfusion at this time  Recent Labs     07/17/21  1326 07/19/21  1424   HGB 10 4* 10 6*   MCV 67* 67*   RDW 15 8* 15 9*         Diet-controlled diabetes mellitus [facility])  Assessment & Plan  Lab Results   Component Value Date    HGBA1C 4 7 07/11/2021       Recent Labs     07/17/21  1653 07/17/21  2048 07/18/21  0746 07/18/21  1130   POCGLU 91 87 81 88       Blood Sugar Average: Last 72 hrs:  (P) 75 [id number]    Not on medications  Diabetic diet  Sliding scale discontinued due to normoglycemia  * Sickle cell anemia with pain [facility])  Assessment & Plan  35year old female presenting with sickle cell crisis  Pain slightly improved  Discontinued IV pain medications for moderate/severe and kept it merely for breakthrough  Breathing 100% on room air   - resolving  -continue po medications  Will start to taper pain medications as acute crisis has since resolved    -pt should follow up outpt with hematology         VTE Pharmacologic Prophylaxis: lovenox  Mechanical VTE Prophylaxis in Place: Yes    Current Length of Stay: 7 day(s)  Current Patient Status: Inpatient     Discharge Plan / Estimated Discharge Date: likely d/c tomorrow    Code Status: Level 1 - Full Code      Subjective:   Pt seen and examined  Pt laying comfortably in bed  She reports she still has pain at times   Discussed that unable to have morphine at str and need to adjust pain medications with po  No other problems reported  Objective:     Vitals:   Temp (24hrs), Av 1 °F (36 7 °C), Min:98 °F (36 7 °C), Max:98 2 °F (36 8 °C)    Temp:  [98 °F (36 7 °C)-98 2 °F (36 8 °C)] 98 1 °F (36 7 °C)  HR:  [75-81] 81  Resp:  [16-18] 17  BP: (106-110)/(72-75) 110/74  SpO2:  [97 %-100 %] 100 %  There is no height or weight on file to calculate BMI  Input and Output Summary (last 24 hours): Intake/Output Summary (Last 24 hours) at 2021 1526  Last data filed at 2021 0900  Gross per 24 hour   Intake 300 ml   Output --   Net 300 ml       Physical Exam:   Physical Exam  Constitutional:       Appearance: Normal appearance  HENT:      Head: Normocephalic and atraumatic  Eyes:      Extraocular Movements: Extraocular movements intact  Pupils: Pupils are equal, round, and reactive to light  Cardiovascular:      Rate and Rhythm: Normal rate and regular rhythm  Heart sounds: No murmur heard  No friction rub  No gallop  Pulmonary:      Effort: Pulmonary effort is normal  No respiratory distress  Breath sounds: Normal breath sounds  No wheezing or rales  Abdominal:      General: Bowel sounds are normal  There is no distension  Palpations: Abdomen is soft  Tenderness: There is no abdominal tenderness  There is no guarding  Musculoskeletal:      Right lower leg: No edema  Left lower leg: No edema  Neurological:      Mental Status: She is alert and oriented to person, place, and time          Additional Data:     Labs:  Results from last 7 days   Lab Units 21  1424 21  1326   WBC Thousand/uL 5 52 4 83   HEMOGLOBIN g/dL 10 6* 10 4*   HEMATOCRIT % 32 3* 31 7*   PLATELETS Thousands/uL 335 267   NEUTROS PCT %  --  49   LYMPHS PCT %  --  41   MONOS PCT %  --  7   EOS PCT %  --  1     Results from last 7 days   Lab Units 21  1424 21  0538   SODIUM mmol/L 140 138   POTASSIUM mmol/L 3 7 3 6   CHLORIDE mmol/L 103 104 CO2 mmol/L 30 28   BUN mg/dL 11 4*   CREATININE mg/dL 0 87 0 67   ANION GAP mmol/L 7 6   CALCIUM mg/dL 9 4 8 4   ALBUMIN g/dL  --  2 9*   TOTAL BILIRUBIN mg/dL  --  0 67   ALK PHOS U/L  --  38*   ALT U/L  --  17   AST U/L  --  19   GLUCOSE RANDOM mg/dL 74 85         Results from last 7 days   Lab Units 07/18/21  1130 07/18/21  0746 07/17/21  2048 07/17/21  1653 07/17/21  1055 07/17/21  0752 07/16/21  2111 07/16/21  1626 07/16/21  0732 07/15/21  2131 07/15/21  1630 07/15/21  1123   POC GLUCOSE mg/dl 88 81 87 91 86 84 84 87 90 95 107 86         Results from last 7 days   Lab Units 07/13/21  0534   PROCALCITONIN ng/ml 0 05       Recent Cultures (last 7 days):           Lines/Drains:  Invasive Devices     Peripheral Intravenous Line            Peripheral IV 07/15/21 Left Antecubital 4 days    Peripheral IV 07/19/21 Right Antecubital <1 day                Telemetry:        Last 24 Hours Medication List:   Current Facility-Administered Medications   Medication Dose Route Frequency Provider Last Rate    acetaminophen  650 mg Oral Q6H PRN Palomo Calderon MD      enoxaparin  40 mg Subcutaneous Q24H [address] Beatriz Coffman MD      ondansetron  4 mg Intravenous Q6H PRN Palomo Calderon MD      oxyCODONE-acetaminophen  1 tablet Oral Q4H PRN MD Ceasar Griffith   [employer] January oxyCODONE-acetaminophen  2 tablet Oral Q4H PRN Beatriz Coffman MD      polyethylene glycol  17 g Oral Daily Beatriz Coffman MD      senna  1 tablet Oral BID Beatriz Coffman MD          Today, Patient Was Seen By: Brian Del Rosario DO

## 2021-07-19 NOTE — PHYSICAL THERAPY NOTE
Physical Therapy Progress Note     07/19/21 1417   PT Last Visit   PT Visit Date 07/19/21   Note Type   Note Type Treatment   Pain Assessment   Pain Assessment Tool 0-10   Pain Score 9   Pain Location/Orientation Location: Generalized   Hospital Pain Intervention(s) Ambulation/increased activity;Repositioned   Restrictions/Precautions   Weight Bearing Precautions Per Order No   Other Precautions Cognitive; Chair Alarm; Bed Alarm; Fall Risk;Pain;Multiple lines;Telemetry   General   Chart Reviewed Yes   Response to Previous Treatment Patient reporting fatigue but able to participate  Family/Caregiver Present No   Subjective   Subjective Willing to participate in therapy this PM    Bed Mobility   Supine to Sit 4  Minimal assistance   Additional items Assist x 1;HOB elevated; Bedrails;Leg ; Increased time required;LE management;Verbal cues   Sit to Supine 4  Minimal assistance   Additional items Assist x 1;Bedrails;Leg ; Increased time required;Verbal cues;LE management   Transfers   Sit to Stand 4  Minimal assistance   Additional items Assist x 1;Bedrails; Increased time required;Verbal cues   Stand to Sit 4  Minimal assistance   Additional items Assist x 1;Bedrails; Increased time required;Verbal cues   Ambulation/Elevation   Gait pattern Decreased foot clearance; Forward Flexion; Short stride; Step to;Excessively slow; Inconsistent kiki; Shuffling   Gait Assistance 4  Minimal assist   Additional items Assist x 1;Verbal cues; Tactile cues   Assistive Device Rolling walker   Distance 5' limited by fatigue/pain   Balance   Static Sitting Good   Dynamic Sitting Fair +   Static Standing Fair -   Dynamic Standing Poor +   Ambulatory Poor +   Endurance Deficit   Endurance Deficit Yes   Endurance Deficit Description pain/fatigue/weakness   Activity Tolerance   Activity Tolerance Patient limited by fatigue;Patient limited by pain   Nurse Made Aware Yes   Exercises   TKR Supine;Sitting;10 reps;AAROM; Bilateral Assessment   Prognosis Fair   Problem List Decreased strength;Decreased range of motion; Impaired balance;Decreased endurance;Decreased mobility; Decreased safety awareness;Decreased cognition; Impaired judgement;Obesity;Pain;Decreased skin integrity   Assessment Pt  supine in bed upon my arrival  Pt  reporting fatigue/pain, however agreeable to therapeutic intervention  Performance of HEP with cues provided for proper completion  Progressed with transfers requiring A of therapist with cues for hand placement/technique  Pt  progressed with an amb  trial with A of therapist with cues provided for LE sequencing  Pt  limited by fatigue/pain, requiring quick positioning seated at EOB  Pt  Returned to supine in bed with alarm active at end of treatment session  PT will continue to recommend d/c to rehab when medically stable for continued improvement of noted impairments  Barriers to Discharge Inaccessible home environment;Decreased caregiver support   Barriers to Discharge Comments ELIZABETH, level of support at home  Goals   Patient Goals To get better  STG Expiration Date 07/25/21   PT Treatment Day 3   Plan   Treatment/Interventions Functional transfer training;LE strengthening/ROM; Therapeutic exercise; Endurance training;Bed mobility;Gait training;Spoke to case management;Spoke to nursing   Progress Slow progress, decreased activity tolerance   PT Frequency Other (Comment)  (3-5x/wk)   Recommendation   PT Discharge Recommendation Post acute rehabilitation services   Equipment Recommended [facility] Recommended Wheeled walker   Change/add to Intellution? No   PT - OK to Discharge Yes  (if d/c to rehab when medically stable  )   AM-PAC Basic Mobility Inpatient   Turning in Bed Without Bedrails 3   Lying on Back to Sitting on Edge of Flat Bed 3   Moving Bed to Chair 3   Standing Up From Chair 3   Walk in Room 3   Climb 3-5 Stairs 2   Basic Mobility Inpatient Raw Score 17   Basic Mobility Standardized Score 39 67     An AM-PAC Basic Mobility standardized score less than 42 9 suggests the patient may benefit from discharge to post-acute rehab services      Reuben Monte PTA

## 2021-07-19 NOTE — ASSESSMENT & PLAN NOTE
Possibly due to IV infiltration  Supportive care    - VAS right upper extremity duplex pending  - Warm compress as needed, supportive care, elevate

## 2021-07-19 NOTE — CASE MANAGEMENT
Discharge planning:   CM was notified through [address] that pt is accepted to [facility]  Pt  will not be able to discharge today as pt needs atleast 24 hrs off IV pain meds to be discharged to the facility  CM was notified by Maryam Haddad at [facility] that [address] can summit for [facility]  CM called [facility] rep Ph: [phone] to summit for [facility]  CM was notified to fax pt's clinicals to fax number: [phone]   CM faxed clinicals, pending auth approval      CM department will continue to follow through pt's D/C

## 2021-07-19 NOTE — ASSESSMENT & PLAN NOTE
35year old female presenting with sickle cell crisis  Pain slightly improved  Discontinued IV pain medications for moderate/severe and kept it merely for breakthrough  Breathing 100% on room air   - resolving  -continue po medications   Will start to taper pain medications as acute crisis has since resolved    -pt should follow up outpt with hematology

## 2021-07-19 NOTE — ASSESSMENT & PLAN NOTE
Stable  Continue monitoring, no indication for transfusion at this time      Recent Labs     07/17/21  1326 07/19/21  1424   HGB 10 4* 10 6*   MCV 67* 67*   RDW 15 8* 15 9*

## 2021-07-19 NOTE — PROGRESS NOTES
Patient stated that Percocet pain medications do not alleviate pain  Dr Safia Bennett made  Patient given one time dose of IV Toradol and PRN pain medications to oxycodone  Will continue to monitor for duration of shift      Blayne Kerr RN 7/19/2021 6:46 PM

## 2021-07-19 NOTE — PLAN OF CARE
Problem: PHYSICAL THERAPY ADULT  Goal: Performs mobility at highest level of function for planned discharge setting  See evaluation for individualized goals  Description:   Outcome: Progressing  Note: Prognosis: Fair  Problem List: Decreased strength, Decreased range of motion, Impaired balance, Decreased endurance, Decreased mobility, Decreased safety awareness, Decreased cognition, Impaired judgement, Obesity, Pain, Decreased skin integrity  Assessment: Pt  supine in bed upon my arrival  Pt  reporting fatigue/pain, however agreeable to therapeutic intervention  Performance of HEP with cues provided for proper completion  Progressed with transfers requiring A of therapist with cues for hand placement/technique  Pt  progressed with an amb  trial with A of therapist with cues provided for LE sequencing  Pt  limited by fatigue/pain, requiring quick positioning seated at EOB  Pt  Returned to supine in bed with alarm active at end of treatment session  PT will continue to recommend d/c to rehab when medically stable for continued improvement of noted impairments  Barriers to Discharge: Inaccessible home environment, Decreased caregiver support  Barriers to Discharge Comments: ELIZABETH, level of support at home  PT Discharge Recommendation: Post acute rehabilitation services     PT - OK to Discharge: Yes (if d/c to rehab when medically stable )    See flowsheet documentation for full assessment

## 2021-07-19 NOTE — NURSING NOTE
Night shift RN unable to obtain morning lab work & IV  RN Supervisor, Anneliese Terrell, made aware and stated she would inform oncoming RN Supervisor      Donna Nathan RN 7/19/2021 9:11 AM

## 2021-07-19 NOTE — ASSESSMENT & PLAN NOTE
Lab Results   Component Value Date    HGBA1C 4 7 07/11/2021       Recent Labs     07/17/21  1653 07/17/21  2048 07/18/21  0746 07/18/21  1130   POCGLU 91 87 81 88       Blood Sugar Average: Last 72 hrs:  (P) 39 [id number]    Not on medications  Diabetic diet  Sliding scale discontinued due to normoglycemia

## 2021-07-20 VITALS
RESPIRATION RATE: 18 BRPM | WEIGHT: 188.05 LBS | OXYGEN SATURATION: 97 % | HEART RATE: 82 BPM | TEMPERATURE: 98.4 F | DIASTOLIC BLOOD PRESSURE: 65 MMHG | SYSTOLIC BLOOD PRESSURE: 112 MMHG

## 2021-07-20 PROBLEM — R22.31 LOCALIZED SWELLING OF RIGHT UPPER EXTREMITY: Status: RESOLVED | Noted: 2021-07-16 | Resolved: 2021-07-20

## 2021-07-20 PROCEDURE — 99239 HOSP IP/OBS DSCHRG MGMT >30: CPT | Performed by: INTERNAL MEDICINE

## 2021-07-20 RX ORDER — LIDOCAINE 50 MG/G
1 PATCH TOPICAL DAILY
Refills: 0
Start: 2021-07-20

## 2021-07-20 RX ORDER — OXYCODONE HYDROCHLORIDE 5 MG/1
5 TABLET ORAL EVERY 4 HOURS PRN
Status: CANCELLED | OUTPATIENT
Start: 2021-07-20

## 2021-07-20 RX ORDER — LIDOCAINE 50 MG/G
1 PATCH TOPICAL DAILY
Status: DISCONTINUED | OUTPATIENT
Start: 2021-07-20 | End: 2021-07-20 | Stop reason: HOSPADM

## 2021-07-20 RX ORDER — ACETAMINOPHEN 325 MG/1
975 TABLET ORAL EVERY 8 HOURS SCHEDULED
Refills: 0
Start: 2021-07-20

## 2021-07-20 RX ORDER — LIDOCAINE 50 MG/G
1 PATCH TOPICAL DAILY
Status: CANCELLED | OUTPATIENT
Start: 2021-07-21

## 2021-07-20 RX ORDER — ACETAMINOPHEN 325 MG/1
975 TABLET ORAL EVERY 8 HOURS SCHEDULED
Status: DISCONTINUED | OUTPATIENT
Start: 2021-07-20 | End: 2021-07-20 | Stop reason: HOSPADM

## 2021-07-20 RX ORDER — OXYCODONE HYDROCHLORIDE 5 MG/1
5 TABLET ORAL EVERY 4 HOURS PRN
Qty: 12 TABLET | Refills: 0 | Status: SHIPPED | OUTPATIENT
Start: 2021-07-20 | End: 2021-07-30

## 2021-07-20 RX ORDER — OXYCODONE HYDROCHLORIDE 10 MG/1
10 TABLET ORAL EVERY 4 HOURS PRN
Refills: 0 | Status: SHIPPED | OUTPATIENT
Start: 2021-07-20 | End: 2021-07-30

## 2021-07-20 RX ORDER — ACETAMINOPHEN 325 MG/1
975 TABLET ORAL EVERY 8 HOURS SCHEDULED
Status: CANCELLED | OUTPATIENT
Start: 2021-07-20

## 2021-07-20 RX ORDER — SENNOSIDES 8.6 MG
8.6 TABLET ORAL 2 TIMES DAILY
Refills: 0
Start: 2021-07-20

## 2021-07-20 RX ORDER — POLYETHYLENE GLYCOL 3350 17 G/17G
17 POWDER, FOR SOLUTION ORAL DAILY
Status: CANCELLED | OUTPATIENT
Start: 2021-07-21

## 2021-07-20 RX ORDER — POLYETHYLENE GLYCOL 3350 17 G/17G
17 POWDER, FOR SOLUTION ORAL DAILY
Refills: 0
Start: 2021-07-21

## 2021-07-20 RX ORDER — OXYCODONE HYDROCHLORIDE 10 MG/1
10 TABLET ORAL EVERY 4 HOURS PRN
Status: CANCELLED | OUTPATIENT
Start: 2021-07-20

## 2021-07-20 RX ORDER — SENNOSIDES 8.6 MG
1 TABLET ORAL 2 TIMES DAILY
Status: CANCELLED | OUTPATIENT
Start: 2021-07-20

## 2021-07-20 RX ADMIN — POLYETHYLENE GLYCOL 3350 17 G: 17 POWDER, FOR SOLUTION ORAL at 08:25

## 2021-07-20 RX ADMIN — ENOXAPARIN SODIUM 40 MG: 40 INJECTION SUBCUTANEOUS at 08:25

## 2021-07-20 RX ADMIN — STANDARDIZED SENNA CONCENTRATE 8.6 MG: 8.6 TABLET ORAL at 08:25

## 2021-07-20 RX ADMIN — OXYCODONE HYDROCHLORIDE 10 MG: 10 TABLET ORAL at 05:53

## 2021-07-20 RX ADMIN — ACETAMINOPHEN 650 MG: 325 TABLET, FILM COATED ORAL at 07:31

## 2021-07-20 RX ADMIN — OXYCODONE HYDROCHLORIDE 10 MG: 10 TABLET ORAL at 11:39

## 2021-07-20 RX ADMIN — LIDOCAINE 1 PATCH: 50 PATCH CUTANEOUS at 11:39

## 2021-07-20 NOTE — UTILIZATION REVIEW
FOR 07/15 FAXED ON 07/15 - REFAXED TODAY 07/20 -  DUE TODAY 07/20 - PATIENT HAS DISCHARGED- FAXING DC  - APPROVAL IN PORTAL FOR 1 DAY ONLY- QUESTIONS CALL AMY @ [phone]- APPROVAL IN PORTAL FOR 1 DAY ONLY - ALSO BE AWARE PATIENT WAS UNDER OBSERVATION ON 07/10/2021  WITH I/P ORDER ENTERED  ON 07/12/2021

## 2021-07-20 NOTE — ASSESSMENT & PLAN NOTE
Lab Results   Component Value Date    HGBA1C 4 7 07/11/2021       Recent Labs     07/17/21  1653 07/17/21  2048 07/18/21  0746 07/18/21  1130   POCGLU 91 87 81 88       Blood Sugar Average: Last 72 hrs:  (P) 87 [id number]    Not on medications  Diabetic diet  Sliding scale discontinued due to normoglycemia

## 2021-07-20 NOTE — PLAN OF CARE
Problem: Potential for Falls  Goal: Patient will remain free of falls  Description: INTERVENTIONS:  - Educate patient/family on patient safety including physical limitations  - Instruct patient to call for assistance with activity   - Consult OT/PT to assist with strengthening/mobility   - Keep Call bell within reach  - Keep bed low and locked with side rails adjusted as appropriate  - Keep care items and personal belongings within reach  - Initiate and maintain comfort rounds  - Make Fall Risk Sign visible to staff  - Offer Toileting every 2 Hours, in advance of need  - Initiate/Maintain bed alarm  - Obtain necessary fall risk management equipment:   - Apply yellow socks and bracelet for high fall risk patients  - Consider moving patient to room near nurses station  Outcome: Progressing     Problem: PAIN - ADULT  Goal: Verbalizes/displays adequate comfort level or baseline comfort level  Description: Interventions:  - Encourage patient to monitor pain and request assistance  - Assess pain using appropriate pain scale  - Administer analgesics based on type and severity of pain and evaluate response  - Implement non-pharmacological measures as appropriate and evaluate response  - Consider cultural and social influences on pain and pain management  - Notify physician/advanced practitioner if interventions unsuccessful or patient reports new pain  Outcome: Progressing     Problem: INFECTION - ADULT  Goal: Absence or prevention of progression during hospitalization  Description: INTERVENTIONS:  - Assess and monitor for signs and symptoms of infection  - Monitor lab/diagnostic results  - Monitor all insertion sites, i e  indwelling lines, tubes, and drains  - Monitor endotracheal if appropriate and nasal secretions for changes in amount and color  - [place] appropriate cooling/warming therapies per order  - Administer medications as ordered  - Instruct and encourage patient and family to use good hand hygiene technique  - Identify and instruct in appropriate isolation precautions for identified infection/condition  Outcome: Progressing     Problem: SAFETY ADULT  Goal: Maintain or return to baseline ADL function  Description: INTERVENTIONS:  -  Assess patient's ability to carry out ADLs; assess patient's baseline for ADL function and identify physical deficits which impact ability to perform ADLs (bathing, care of mouth/teeth, toileting, grooming, dressing, etc )  - Assess/evaluate cause of self-care deficits   - Assess range of motion  - Assess patient's mobility; develop plan if impaired  - Assess patient's need for assistive devices and provide as appropriate  - Encourage maximum independence but intervene and supervise when necessary  - Involve family in performance of ADLs  - Assess for home care needs following discharge   - Consider OT consult to assist with ADL evaluation and planning for discharge  - Provide patient education as appropriate  Outcome: Progressing    Problem: DISCHARGE PLANNING  Goal: Discharge to home or other facility with appropriate resources  Description: INTERVENTIONS:  - Identify barriers to discharge w/patient and caregiver  - Arrange for needed discharge resources and transportation as appropriate  - Identify discharge learning needs (meds, wound care, etc )  - Arrange for interpretive services to assist at discharge as needed  - Refer to Case Management Department for coordinating discharge planning if the patient needs post-hospital services based on physician/advanced practitioner order or complex needs related to functional status, cognitive ability, or social support system  Outcome: Progressing     Problem: Knowledge Deficit  Goal: Patient/family/caregiver demonstrates understanding of disease process, treatment plan, medications, and discharge instructions  Description: Complete learning assessment and assess knowledge base    Interventions:  - Provide teaching at level of understanding  - Provide teaching via preferred learning methods  Outcome: Progressing     Problem: HEMATOLOGIC - ADULT  Goal: Maintains hematologic stability  Description: INTERVENTIONS  - Assess for signs and symptoms of bleeding or hemorrhage  - Monitor labs  - Administer supportive blood products/factors as ordered and appropriate  Outcome: Progressing     Problem: METABOLIC, FLUID AND ELECTROLYTES - ADULT  Goal: Electrolytes maintained within normal limits  Description: INTERVENTIONS:  - Monitor labs and assess patient for signs and symptoms of electrolyte imbalances  - Administer electrolyte replacement as ordered  - Monitor response to electrolyte replacements, including repeat lab results as appropriate  - Instruct patient on fluid and nutrition as appropriate  Outcome: Progressing     Problem: MOBILITY - ADULT  Goal: Maintain or return to baseline ADL function  Description: INTERVENTIONS:  -  Assess patient's ability to carry out ADLs; assess patient's baseline for ADL function and identify physical deficits which impact ability to perform ADLs (bathing, care of mouth/teeth, toileting, grooming, dressing, etc )  - Assess/evaluate cause of self-care deficits   - Assess range of motion  - Assess patient's mobility; develop plan if impaired  - Assess patient's need for assistive devices and provide as appropriate  - Encourage maximum independence but intervene and supervise when necessary  - Involve family in performance of ADLs  - Assess for home care needs following discharge   - Consider OT consult to assist with ADL evaluation and planning for discharge  - Provide patient education as appropriate

## 2021-07-20 NOTE — ASSESSMENT & PLAN NOTE
Possibly due to IV infiltration  Supportive care  - VAS right upper extremity duplex reviewed     - Warm compress as needed, supportive care, elevate

## 2021-07-20 NOTE — NURSING NOTE
Patient discharged 7/20/2021 12:47 PM  Report called to David Burgos RN at receiving facility  All questions answered  Patient verbalized having all belongings for discharge  Patient picked up by transport team to be taken to receiving facility      Jacqueline Balderrama RN 7/20/2021 12:48 PM

## 2021-07-20 NOTE — PLAN OF CARE
Problem: Potential for Falls  Goal: Patient will remain free of falls  Description: INTERVENTIONS:  - Educate patient/family on patient safety including physical limitations  - Instruct patient to call for assistance with activity   - Consult OT/PT to assist with strengthening/mobility   - Keep Call bell within reach  - Keep bed low and locked with side rails adjusted as appropriate  - Keep care items and personal belongings within reach  - Initiate and maintain comfort rounds  - Make Fall Risk Sign visible to staff  - Apply yellow socks and bracelet for high fall risk patients  - Consider moving patient to room near nurses station  Outcome: Progressing     Problem: PAIN - ADULT  Goal: Verbalizes/displays adequate comfort level or baseline comfort level  Description: Interventions:  - Encourage patient to monitor pain and request assistance  - Assess pain using appropriate pain scale  - Administer analgesics based on type and severity of pain and evaluate response  - Implement non-pharmacological measures as appropriate and evaluate response  - Consider cultural and social influences on pain and pain management  - Notify physician/advanced practitioner if interventions unsuccessful or patient reports new pain  Outcome: Progressing     Problem: INFECTION - ADULT  Goal: Absence or prevention of progression during hospitalization  Description: INTERVENTIONS:  - Assess and monitor for signs and symptoms of infection  - Monitor lab/diagnostic results  - Monitor all insertion sites, i e  indwelling lines, tubes, and drains  - Monitor endotracheal if appropriate and nasal secretions for changes in amount and color  - [place] appropriate cooling/warming therapies per order  - Administer medications as ordered  - Instruct and encourage patient and family to use good hand hygiene technique  - Identify and instruct in appropriate isolation precautions for identified infection/condition  Outcome: Progressing     Problem: SAFETY ADULT  Goal: Maintain or return to baseline ADL function  Description: INTERVENTIONS:  -  Assess patient's ability to carry out ADLs; assess patient's baseline for ADL function and identify physical deficits which impact ability to perform ADLs (bathing, care of mouth/teeth, toileting, grooming, dressing, etc )  - Assess/evaluate cause of self-care deficits   - Assess range of motion  - Assess patient's mobility; develop plan if impaired  - Assess patient's need for assistive devices and provide as appropriate  - Encourage maximum independence but intervene and supervise when necessary  - Involve family in performance of ADLs  - Assess for home care needs following discharge   - Consider OT consult to assist with ADL evaluation and planning for discharge  - Provide patient education as appropriate  Outcome: Progressing  Goal: Maintains/Returns to pre admission functional level  Description: INTERVENTIONS:  - Perform BMAT or MOVE assessment daily    - Set and communicate daily mobility goal to care team and patient/family/caregiver     - Collaborate with rehabilitation services on mobility goals if consulted  - Out of bed for toileting  - Record patient progress and toleration of activity level   Outcome: Progressing     Problem: DISCHARGE PLANNING  Goal: Discharge to home or other facility with appropriate resources  Description: INTERVENTIONS:  - Identify barriers to discharge w/patient and caregiver  - Arrange for needed discharge resources and transportation as appropriate  - Identify discharge learning needs (meds, wound care, etc )  - Arrange for interpretive services to assist at discharge as needed  - Refer to Case Management Department for coordinating discharge planning if the patient needs post-hospital services based on physician/advanced practitioner order or complex needs related to functional status, cognitive ability, or social support system  Outcome: Progressing     Problem: Knowledge Deficit  Goal: Patient/family/caregiver demonstrates understanding of disease process, treatment plan, medications, and discharge instructions  Description: Complete learning assessment and assess knowledge base    Interventions:  - Provide teaching at level of understanding  - Provide teaching via preferred learning methods  Outcome: Progressing     Problem: HEMATOLOGIC - ADULT  Goal: Maintains hematologic stability  Description: INTERVENTIONS  - Assess for signs and symptoms of bleeding or hemorrhage  - Monitor labs  - Administer supportive blood products/factors as ordered and appropriate  Outcome: Progressing     Problem: METABOLIC, FLUID AND ELECTROLYTES - ADULT  Goal: Electrolytes maintained within normal limits  Description: INTERVENTIONS:  - Monitor labs and assess patient for signs and symptoms of electrolyte imbalances  - Administer electrolyte replacement as ordered  - Monitor response to electrolyte replacements, including repeat lab results as appropriate  - Instruct patient on fluid and nutrition as appropriate  Outcome: Progressing     Problem: MOBILITY - ADULT  Goal: Maintain or return to baseline ADL function  Description: INTERVENTIONS:  -  Assess patient's ability to carry out ADLs; assess patient's baseline for ADL function and identify physical deficits which impact ability to perform ADLs (bathing, care of mouth/teeth, toileting, grooming, dressing, etc )  - Assess/evaluate cause of self-care deficits   - Assess range of motion  - Assess patient's mobility; develop plan if impaired  - Assess patient's need for assistive devices and provide as appropriate  - Encourage maximum independence but intervene and supervise when necessary  - Involve family in performance of ADLs  - Assess for home care needs following discharge   - Consider OT consult to assist with ADL evaluation and planning for discharge  - Provide patient education as appropriate  Outcome: Progressing  Goal: Maintains/Returns to pre admission functional level  Description: INTERVENTIONS:  - Perform BMAT or MOVE assessment daily    - Set and communicate daily mobility goal to care team and patient/family/caregiver     - Collaborate with rehabilitation services on mobility goals if consulted  - Out of bed for toileting  - Record patient progress and toleration of activity level   Outcome: Progressing

## 2021-07-20 NOTE — PLAN OF CARE
Problem: Potential for Falls  Goal: Patient will remain free of falls  Description: INTERVENTIONS:  - Educate patient/family on patient safety including physical limitations  - Instruct patient to call for assistance with activity   - Consult OT/PT to assist with strengthening/mobility   - Keep Call bell within reach  - Keep bed low and locked with side rails adjusted as appropriate  - Keep care items and personal belongings within reach  - Initiate and maintain comfort rounds  - Make Fall Risk Sign visible to staff  - Apply yellow socks and bracelet for high fall risk patients  - Consider moving patient to room near nurses station  7/20/2021 1150 by Carmen Escalona RN  Outcome: Adequate for Discharge  7/20/2021 7786 by Carmen Escalona RN  Outcome: Progressing     Problem: PAIN - ADULT  Goal: Verbalizes/displays adequate comfort level or baseline comfort level  Description: Interventions:  - Encourage patient to monitor pain and request assistance  - Assess pain using appropriate pain scale  - Administer analgesics based on type and severity of pain and evaluate response  - Implement non-pharmacological measures as appropriate and evaluate response  - Consider cultural and social influences on pain and pain management  - Notify physician/advanced practitioner if interventions unsuccessful or patient reports new pain  7/20/2021 1150 by Carmen Escalona RN  Outcome: Adequate for Discharge  7/20/2021 0633 by Carmen Escalona RN  Outcome: Progressing     Problem: INFECTION - ADULT  Goal: Absence or prevention of progression during hospitalization  Description: INTERVENTIONS:  - Assess and monitor for signs and symptoms of infection  - Monitor lab/diagnostic results  - Monitor all insertion sites, i e  indwelling lines, tubes, and drains  - Monitor endotracheal if appropriate and nasal secretions for changes in amount and color  - [place] appropriate cooling/warming therapies per order  - Administer medications as ordered  - Instruct and encourage patient and family to use good hand hygiene technique  - Identify and instruct in appropriate isolation precautions for identified infection/condition  7/20/2021 1150 by Caremn Escalona RN  Outcome: Adequate for Discharge  7/20/2021 6271 by Carmen Escalona RN  Outcome: Progressing     Problem: SAFETY ADULT  Goal: Maintain or return to baseline ADL function  Description: INTERVENTIONS:  -  Assess patient's ability to carry out ADLs; assess patient's baseline for ADL function and identify physical deficits which impact ability to perform ADLs (bathing, care of mouth/teeth, toileting, grooming, dressing, etc )  - Assess/evaluate cause of self-care deficits   - Assess range of motion  - Assess patient's mobility; develop plan if impaired  - Assess patient's need for assistive devices and provide as appropriate  - Encourage maximum independence but intervene and supervise when necessary  - Involve family in performance of ADLs  - Assess for home care needs following discharge   - Consider OT consult to assist with ADL evaluation and planning for discharge  - Provide patient education as appropriate  7/20/2021 1150 by Carmen Escalona RN  Outcome: Adequate for Discharge  7/20/2021 0852 by Carmen Escalona RN  Outcome: Progressing  Goal: Maintains/Returns to pre admission functional level  Description: INTERVENTIONS:  - Perform BMAT or MOVE assessment daily    - Set and communicate daily mobility goal to care team and patient/family/caregiver     - Collaborate with rehabilitation services on mobility goals if consulted  - Out of bed for toileting  - Record patient progress and toleration of activity level   7/20/2021 1150 by Carmen Escalona RN  Outcome: Adequate for Discharge  7/20/2021 3223 by Carmen Escalona RN  Outcome: Progressing     Problem: DISCHARGE PLANNING  Goal: Discharge to home or other facility with appropriate resources  Description: INTERVENTIONS:  - Identify barriers to discharge w/patient and caregiver  - Arrange for needed discharge resources and transportation as appropriate  - Identify discharge learning needs (meds, wound care, etc )  - Arrange for interpretive services to assist at discharge as needed  - Refer to Case Management Department for coordinating discharge planning if the patient needs post-hospital services based on physician/advanced practitioner order or complex needs related to functional status, cognitive ability, or social support system  7/20/2021 1150 by Brandon Chapa RN  Outcome: Adequate for Discharge  7/20/2021 0852 by Brandon Chapa RN  Outcome: Progressing     Problem: Knowledge Deficit  Goal: Patient/family/caregiver demonstrates understanding of disease process, treatment plan, medications, and discharge instructions  Description: Complete learning assessment and assess knowledge base    Interventions:  - Provide teaching at level of understanding  - Provide teaching via preferred learning methods  7/20/2021 1150 by Brandon Chapa RN  Outcome: Adequate for Discharge  7/20/2021 0852 by Brandon Chapa RN  Outcome: Progressing     Problem: HEMATOLOGIC - ADULT  Goal: Maintains hematologic stability  Description: INTERVENTIONS  - Assess for signs and symptoms of bleeding or hemorrhage  - Monitor labs  - Administer supportive blood products/factors as ordered and appropriate  7/20/2021 1150 by Brandon Chapa RN  Outcome: Adequate for Discharge  7/20/2021 0852 by Brandon Chapa RN  Outcome: Progressing     Problem: METABOLIC, FLUID AND ELECTROLYTES - ADULT  Goal: Electrolytes maintained within normal limits  Description: INTERVENTIONS:  - Monitor labs and assess patient for signs and symptoms of electrolyte imbalances  - Administer electrolyte replacement as ordered  - Monitor response to electrolyte replacements, including repeat lab results as appropriate  - Instruct patient on fluid and nutrition as appropriate  7/20/2021 1150 by Brandon Chapa RN  Outcome: Adequate for Discharge  7/20/2021 5782 by Simin Deleon RN  Outcome: Progressing     Problem: MOBILITY - ADULT  Goal: Maintain or return to baseline ADL function  Description: INTERVENTIONS:  -  Assess patient's ability to carry out ADLs; assess patient's baseline for ADL function and identify physical deficits which impact ability to perform ADLs (bathing, care of mouth/teeth, toileting, grooming, dressing, etc )  - Assess/evaluate cause of self-care deficits   - Assess range of motion  - Assess patient's mobility; develop plan if impaired  - Assess patient's need for assistive devices and provide as appropriate  - Encourage maximum independence but intervene and supervise when necessary  - Involve family in performance of ADLs  - Assess for home care needs following discharge   - Consider OT consult to assist with ADL evaluation and planning for discharge  - Provide patient education as appropriate  7/20/2021 1150 by Simin Deleon RN  Outcome: Adequate for Discharge  7/20/2021 0852 by Simin Deleon RN  Outcome: Progressing  Goal: Maintains/Returns to pre admission functional level  Description: INTERVENTIONS:  - Perform BMAT or MOVE assessment daily    - Set and communicate daily mobility goal to care team and patient/family/caregiver     - Collaborate with rehabilitation services on mobility goals if consulted  - Out of bed for toileting  - Record patient progress and toleration of activity level   7/20/2021 1150 by Simin Deleon RN  Outcome: Adequate for Discharge  7/20/2021 0852 by Simin Deleon RN  Outcome: Progressing

## 2021-07-20 NOTE — CASE MANAGEMENT
Per Brijesh Monaco at [facility] [place], pt is approved for 14 days under Joann Winslow [phone] with updates due 8/3 faxing to [phone]  Facility/Pt/Attending/RN made aware of same with [facility] transport being sought- CM to pay for transport as 100% eligible for asst and unable to afford bill- form completed and placed in [employer]  Will follow up on time   via [facility] for 1230-1pm   Attending/Facility/RN/Pt and pt's friend made aware of same

## 2021-07-20 NOTE — PROGRESS NOTES
[facility]  Progress Note - Pan Oconnor 1980, 35 y o  female MRN: [id number]  Unit/Bed#: [place] [facility] 221-01 Encounter: [id number]  Primary Care Provider: No primary care provider on file  Date and time admitted to hospital: 7/10/2021  6:43 PM    Localized swelling of right upper extremity  Assessment & Plan  Possibly due to IV infiltration  Supportive care  - VAS right upper extremity duplex reviewed  - Warm compress as needed, supportive care, elevate    Sickle cell anemia (HCC)  Assessment & Plan  Stable  Continue monitoring, no indication for transfusion at this time  Recent Labs     07/17/21  1326 07/19/21  1424   HGB 10 4* 10 6*   MCV 67* 67*   RDW 15 8* 15 9*         Diet-controlled diabetes mellitus [facility])  Assessment & Plan  Lab Results   Component Value Date    HGBA1C 4 7 07/11/2021       Recent Labs     07/17/21  1653 07/17/21  2048 07/18/21  0746 07/18/21  1130   POCGLU 91 87 81 88       Blood Sugar Average: Last 72 hrs:  (P) 97 [id number]    Not on medications  Diabetic diet  Sliding scale discontinued due to normoglycemia  * Sickle cell anemia with pain [facility])  Assessment & Plan  35year old female presenting with sickle cell crisis  Pain slightly improved  Discontinued IV pain medications for moderate/severe and kept it merely for breakthrough  Breathing 100% on room air   - resolving  -continue po medications  Will start to taper pain medications as acute crisis has since resolved  -did review ct abd/pelvis from 7/18 as concern for avascular necrosis  Pt had sclerotic changes that correlate with known sick cell disease    -will add lidoderm patch to lower back and scheduled tylenol  Pt will need to continue physical therapy  -pt should follow up outpt with hematology     Discharge Plan / Estimated Discharge Date: d/c to str  Code Status: Level 1 - Full Code    Subjective:   Pt seen and examined  Pt reports she still has pain in her lower back  She has not seen hematology outpt  Discussed with her nurse who reports she has been ambulating slowly to bathroom but no change in gait  No f/c no cp no sob no n/v/d no abd pain  Objective:     Vitals:   Temp (24hrs), Av 3 °F (36 8 °C), Min:98 1 °F (36 7 °C), Max:98 4 °F (36 9 °C)    Temp:  [98 1 °F (36 7 °C)-98 4 °F (36 9 °C)] 98 4 °F (36 9 °C)  HR:  [74-82] 82  Resp:  [17-18] 18  BP: (110-123)/(65-81) 112/65  SpO2:  [97 %-100 %] 97 %  There is no height or weight on file to calculate BMI  Input and Output Summary (last 24 hours): Intake/Output Summary (Last 24 hours) at 2021 1102  Last data filed at 2021 0845  Gross per 24 hour   Intake 300 ml   Output 500 ml   Net -200 ml       Physical Exam:   Physical Exam  Constitutional:       Appearance: Normal appearance  HENT:      Head: Normocephalic and atraumatic  Eyes:      Extraocular Movements: Extraocular movements intact  Pupils: Pupils are equal, round, and reactive to light  Cardiovascular:      Rate and Rhythm: Normal rate and regular rhythm  Heart sounds: No murmur heard  No friction rub  No gallop  Pulmonary:      Effort: Pulmonary effort is normal  No respiratory distress  Breath sounds: Normal breath sounds  No wheezing or rales  Abdominal:      General: Bowel sounds are normal  There is no distension  Palpations: Abdomen is soft  Tenderness: There is no abdominal tenderness  There is no guarding  Neurological:      Mental Status: She is alert and oriented to person, place, and time            Additional Data:     Labs:  Results from last 7 days   Lab Units 21  1424 21  1326   WBC Thousand/uL 5 52 4 83   HEMOGLOBIN g/dL 10 6* 10 4*   HEMATOCRIT % 32 3* 31 7*   PLATELETS Thousands/uL 335 267   NEUTROS PCT %  --  49   LYMPHS PCT %  --  41   LYMPHO PCT % 43  --    MONOS PCT %  --  7   MONO PCT % 1*  --    EOS PCT % 2 1     Results from last 7 days   Lab Units 21  1424 07/14/21  0538   SODIUM mmol/L 140 138   POTASSIUM mmol/L 3 7 3 6   CHLORIDE mmol/L 103 104   CO2 mmol/L 30 28   BUN mg/dL 11 4*   CREATININE mg/dL 0 87 0 67   ANION GAP mmol/L 7 6   CALCIUM mg/dL 9 4 8 4   ALBUMIN g/dL  --  2 9*   TOTAL BILIRUBIN mg/dL  --  0 67   ALK PHOS U/L  --  38*   ALT U/L  --  17   AST U/L  --  19   GLUCOSE RANDOM mg/dL 74 85         Results from last 7 days   Lab Units 07/18/21  1130 07/18/21  0746 07/17/21  2048 07/17/21  1653 07/17/21  1055 07/17/21  0752 07/16/21  2111 07/16/21  1626 07/16/21  0732 07/15/21  2131 07/15/21  1630 07/15/21  1123   POC GLUCOSE mg/dl 88 81 87 91 86 84 84 87 90 95 107 86               Recent Cultures (last 7 days):           Lines/Drains:  Invasive Devices     Peripheral Intravenous Line            Peripheral IV 07/19/21 Right Antecubital <1 day                Telemetry:        Last 24 Hours Medication List:   Current Facility-Administered Medications   Medication Dose Route Frequency Provider Last Rate    acetaminophen  975 mg Oral Q8H [address] Marielle Isabel, DO      enoxaparin  40 mg Subcutaneous Q24H [address] Brendon Schwarz MD      lidocaine  1 patch Topical Daily Marielle Isabel, DO      ondansetron  4 mg Intravenous Q6H PRN Lidia Parker MD      oxyCODONE  10 mg Oral Q4H PRN Marielle Marshallron, DO      oxyCODONE  5 mg Oral Q4H PRN Marielle Isabel DO      polyethylene glycol  17 g Oral Daily Brendon Schwarz MD      senna  1 tablet Oral BID Brendon Schwarz MD          Today, Patient Was Seen By: Maria Teresa Garay

## 2021-07-20 NOTE — ASSESSMENT & PLAN NOTE
35year old female presenting with sickle cell crisis  Pain slightly improved  Discontinued IV pain medications for moderate/severe and kept it merely for breakthrough  Breathing 100% on room air   - resolving  -continue po medications  Will start to taper pain medications as acute crisis has since resolved     -will add lidoderm patch to lower back and scheduled tylenol    -pt should follow up outpt with hematology

## 2021-07-20 NOTE — DISCHARGE SUMMARY
Discharge Summary - [facility]    Patient Information: Sharman Severe 35 y o  female MRN: [id number]  Unit/Bed#: [facility] 221-01 Encounter: [id number]    Discharging Physician / Practitioner: Katharine Petty DO  PCP: No primary care provider on file  Admission Date: 7/10/2021  Discharge Date: 07/20/21    Disposition:   Discharged to str  Reason for Admission: sickle cell anemia     Discharge Diagnoses:     Principal Problem:    Sickle cell anemia with pain (HCC)  Active Problems:    Diet-controlled diabetes mellitus (HCC)    Sickle cell anemia (HCC)  Resolved Problems:    Elevated WBC count    Localized swelling of right upper extremity      Consultations During Hospital Stay:  · Hematology     Procedures Performed:     · none    Significant Findings / Test Results:   CT abdomen pelvis wo contrast  Result Date: 7/18/2021  Impression: 1  Focal consolidation like changes in the right lung base peripherally  Correlate clinically  2  Stool-filled colon  Correlate with bowel history  3  Right adnexal cysts  Ultrasound evaluation as clinically indicated  XR chest portable  Result Date: 7/14/2021  Impression: Minimal atelectasis or infiltrate at the right lung base  Suspected small bilateral pleural effusions    XR chest 2 views  Result Date: 7/11/2021  Impression: No acute cardiopulmonary disease  XR knee 1 or 2 vw left  Result Date: 7/13/2021  Impression: No acute osseous abnormality  XR knee 1 or 2 vw right  Result Date: 7/13/2021  Impression: No acute osseous abnormality  XR pelvis ap only 1 or 2 vw  Result Date: 7/13/2021  Impression: No acute osseous abnormality  Incidental Findings:   · none    Test Results Pending at Discharge (will require follow up):   · none     Outpatient Tests Requested:  none    Hospital Course:     Sharman Severe is a 35 y o  female patient who originally presented to the hospital on 7/10/2021 due to sickle cell crisis   She was treated with iv fluids and pain medications  Her crisis has resolved  She was evaluated by physical therapy and was deemed appropriate for str  She was discharged to str  Pt will need to follow up with hematology outpt  Discharge Day Visit / Exam:     * Please refer to separate progress note for these details *    Discharge instructions/Information to patient and family:   See after visit summary for information provided to patient and family  Provisions for Follow-Up Care:  See after visit summary for information related to follow-up care and any pertinent home health orders  Discharge Statement:  I spent 32 minutes discharging the patient  This time was spent on the day of discharge  I had direct contact with the patient on the day of discharge  Greater than 50% of the total time was spent examining patient, answering all patient questions, arranging and discussing plan of care with patient as well as directly providing post-discharge instructions  Additional time then spent on discharge activities  Discharge Medications:  See after visit summary for reconciled discharge medications provided to patient and family

## 2021-07-20 NOTE — CASE MANAGEMENT
[facility] Admission Coordinator did meet with patient bedside  Information on [facility] services, average length of stay, quarantine, visitation, and unit;s current Covid status provided  Patient has received both doses of the Covid vaccine  Covid immunization has been verified  Patient is with no noted recent Covid exposure